# Patient Record
Sex: FEMALE | Race: WHITE | Employment: OTHER | URBAN - METROPOLITAN AREA
[De-identification: names, ages, dates, MRNs, and addresses within clinical notes are randomized per-mention and may not be internally consistent; named-entity substitution may affect disease eponyms.]

---

## 2017-07-27 ENCOUNTER — HOSPITAL ENCOUNTER (OUTPATIENT)
Dept: PREADMISSION TESTING | Age: 77
Discharge: HOME | End: 2017-07-27
Payer: MEDICARE

## 2017-07-27 VITALS — HEIGHT: 58 IN | WEIGHT: 140 LBS | BODY MASS INDEX: 29.39 KG/M2

## 2017-07-27 LAB
ANION GAP BLD CALC-SCNC: 10 MMOL/L (ref 3–18)
BUN SERPL-MCNC: 21 MG/DL (ref 7–18)
BUN/CREAT SERPL: 22 (ref 12–20)
CALCIUM SERPL-MCNC: 9.3 MG/DL (ref 8.5–10.1)
CHLORIDE SERPL-SCNC: 106 MMOL/L (ref 100–108)
CO2 SERPL-SCNC: 27 MMOL/L (ref 21–32)
CREAT SERPL-MCNC: 0.97 MG/DL (ref 0.6–1.3)
GLUCOSE SERPL-MCNC: 112 MG/DL (ref 74–99)
HCT VFR BLD AUTO: 37.3 % (ref 35–45)
HGB BLD-MCNC: 12.1 G/DL (ref 12–16)
POTASSIUM SERPL-SCNC: 4.2 MMOL/L (ref 3.5–5.5)
SODIUM SERPL-SCNC: 143 MMOL/L (ref 136–145)

## 2017-07-27 PROCEDURE — 80048 BASIC METABOLIC PNL TOTAL CA: CPT | Performed by: UROLOGY

## 2017-07-27 PROCEDURE — 85018 HEMOGLOBIN: CPT | Performed by: UROLOGY

## 2017-07-27 PROCEDURE — 93005 ELECTROCARDIOGRAM TRACING: CPT

## 2017-07-27 RX ORDER — SIMVASTATIN 20 MG/1
20 TABLET, FILM COATED ORAL
COMMUNITY
Start: 2013-08-26 | End: 2017-07-27

## 2017-07-27 RX ORDER — MINERAL OIL
180 ENEMA (ML) RECTAL DAILY
COMMUNITY
Start: 2017-05-12

## 2017-07-27 RX ORDER — MULTIVITAMIN
TABLET ORAL
COMMUNITY
Start: 2017-05-12 | End: 2017-07-27

## 2017-07-27 RX ORDER — BISMUTH SUBSALICYLATE 262 MG
TABLET,CHEWABLE ORAL
COMMUNITY
Start: 2012-11-02 | End: 2017-07-27

## 2017-07-27 RX ORDER — TRAZODONE HYDROCHLORIDE 100 MG/1
100 TABLET ORAL
COMMUNITY
End: 2017-07-27

## 2017-07-27 RX ORDER — ASPIRIN 81 MG/1
TABLET ORAL
COMMUNITY
Start: 2012-11-02 | End: 2019-07-17

## 2017-07-27 RX ORDER — LISINOPRIL 20 MG/1
TABLET ORAL
COMMUNITY
Start: 2017-04-05 | End: 2017-07-27

## 2017-07-27 NOTE — PERIOP NOTES
Chg wipes givenDenies any prostheticsPatient states family physician is aware of upcoming procedure/surgeryPatient instructed to bring cpap machine in on day of procedure/surgeryDenies family history of anesthesia complicationsDenies shortness of breath nor chest pain while climbing stairs

## 2017-07-28 LAB
ATRIAL RATE: 57 BPM
CALCULATED P AXIS, ECG09: 73 DEGREES
CALCULATED R AXIS, ECG10: 19 DEGREES
CALCULATED T AXIS, ECG11: 55 DEGREES
DIAGNOSIS, 93000: NORMAL
P-R INTERVAL, ECG05: 142 MS
Q-T INTERVAL, ECG07: 428 MS
QRS DURATION, ECG06: 98 MS
QTC CALCULATION (BEZET), ECG08: 416 MS
VENTRICULAR RATE, ECG03: 57 BPM

## 2017-08-07 ENCOUNTER — ANESTHESIA EVENT (OUTPATIENT)
Dept: SURGERY | Age: 77
End: 2017-08-07
Payer: MEDICARE

## 2017-08-08 ENCOUNTER — ANESTHESIA (OUTPATIENT)
Dept: SURGERY | Age: 77
End: 2017-08-08
Payer: MEDICARE

## 2017-08-08 ENCOUNTER — APPOINTMENT (OUTPATIENT)
Dept: GENERAL RADIOLOGY | Age: 77
End: 2017-08-08
Attending: UROLOGY
Payer: MEDICARE

## 2017-08-08 ENCOUNTER — HOSPITAL ENCOUNTER (OUTPATIENT)
Age: 77
Setting detail: OUTPATIENT SURGERY
Discharge: HOME | End: 2017-08-08
Attending: UROLOGY | Admitting: UROLOGY
Payer: MEDICARE

## 2017-08-08 VITALS
HEIGHT: 58 IN | WEIGHT: 140.31 LBS | RESPIRATION RATE: 16 BRPM | SYSTOLIC BLOOD PRESSURE: 136 MMHG | HEART RATE: 56 BPM | BODY MASS INDEX: 29.45 KG/M2 | OXYGEN SATURATION: 99 % | TEMPERATURE: 97.3 F | DIASTOLIC BLOOD PRESSURE: 61 MMHG

## 2017-08-08 DIAGNOSIS — R52 PAIN: ICD-10-CM

## 2017-08-08 LAB
ATRIAL RATE: 47 BPM
CALCULATED P AXIS, ECG09: 57 DEGREES
CALCULATED R AXIS, ECG10: 12 DEGREES
CALCULATED T AXIS, ECG11: 23 DEGREES
DIAGNOSIS, 93000: NORMAL
P-R INTERVAL, ECG05: 152 MS
Q-T INTERVAL, ECG07: 454 MS
QRS DURATION, ECG06: 98 MS
QTC CALCULATION (BEZET), ECG08: 401 MS
VENTRICULAR RATE, ECG03: 47 BPM

## 2017-08-08 PROCEDURE — 76010000153 HC OR TIME 1.5 TO 2 HR: Performed by: UROLOGY

## 2017-08-08 PROCEDURE — 74011000250 HC RX REV CODE- 250

## 2017-08-08 PROCEDURE — 76210000016 HC OR PH I REC 1 TO 1.5 HR: Performed by: UROLOGY

## 2017-08-08 PROCEDURE — 76000 FLUOROSCOPY <1 HR PHYS/QHP: CPT

## 2017-08-08 PROCEDURE — C1894 INTRO/SHEATH, NON-LASER: HCPCS | Performed by: UROLOGY

## 2017-08-08 PROCEDURE — 77030011640 HC PAD GRND REM COVD -A: Performed by: UROLOGY

## 2017-08-08 PROCEDURE — 77010033678 HC OXYGEN DAILY

## 2017-08-08 PROCEDURE — 77030012020 HC ANTENA NEURSTM MEDT -B: Performed by: UROLOGY

## 2017-08-08 PROCEDURE — 74011250636 HC RX REV CODE- 250/636

## 2017-08-08 PROCEDURE — C1767 GENERATOR, NEURO NON-RECHARG: HCPCS | Performed by: UROLOGY

## 2017-08-08 PROCEDURE — 74011250636 HC RX REV CODE- 250/636: Performed by: ANESTHESIOLOGY

## 2017-08-08 PROCEDURE — 76210000021 HC REC RM PH II 0.5 TO 1 HR: Performed by: UROLOGY

## 2017-08-08 PROCEDURE — 74011250636 HC RX REV CODE- 250/636: Performed by: UROLOGY

## 2017-08-08 PROCEDURE — 93005 ELECTROCARDIOGRAM TRACING: CPT

## 2017-08-08 PROCEDURE — 77030002986 HC SUT PROL J&J -A: Performed by: UROLOGY

## 2017-08-08 PROCEDURE — 76060000034 HC ANESTHESIA 1.5 TO 2 HR: Performed by: UROLOGY

## 2017-08-08 PROCEDURE — C1778 LEAD, NEUROSTIMULATOR: HCPCS | Performed by: UROLOGY

## 2017-08-08 PROCEDURE — 77030031139 HC SUT VCRL2 J&J -A: Performed by: UROLOGY

## 2017-08-08 PROCEDURE — 77030003028 HC SUT VCRL J&J -A: Performed by: UROLOGY

## 2017-08-08 PROCEDURE — 77030018836 HC SOL IRR NACL ICUM -A: Performed by: UROLOGY

## 2017-08-08 PROCEDURE — 74011000250 HC RX REV CODE- 250: Performed by: UROLOGY

## 2017-08-08 PROCEDURE — 77030020782 HC GWN BAIR PAWS FLX 3M -B: Performed by: UROLOGY

## 2017-08-08 PROCEDURE — C1787 PATIENT PROGR, NEUROSTIM: HCPCS | Performed by: UROLOGY

## 2017-08-08 DEVICE — LEAD KT STIM INTERSTIM 28CM --: Type: IMPLANTABLE DEVICE | Site: SACRUM | Status: FUNCTIONAL

## 2017-08-08 DEVICE — GENERATOR INTERSTIM X --: Type: IMPLANTABLE DEVICE | Site: SACRUM | Status: FUNCTIONAL

## 2017-08-08 RX ORDER — SODIUM CHLORIDE, SODIUM LACTATE, POTASSIUM CHLORIDE, CALCIUM CHLORIDE 600; 310; 30; 20 MG/100ML; MG/100ML; MG/100ML; MG/100ML
1000 INJECTION, SOLUTION INTRAVENOUS CONTINUOUS
Status: DISCONTINUED | OUTPATIENT
Start: 2017-08-08 | End: 2017-08-08 | Stop reason: HOSPADM

## 2017-08-08 RX ORDER — GLYCOPYRROLATE 0.2 MG/ML
INJECTION INTRAMUSCULAR; INTRAVENOUS AS NEEDED
Status: DISCONTINUED | OUTPATIENT
Start: 2017-08-08 | End: 2017-08-08 | Stop reason: HOSPADM

## 2017-08-08 RX ORDER — SULFAMETHOXAZOLE AND TRIMETHOPRIM 400; 80 MG/1; MG/1
1 TABLET ORAL 2 TIMES DAILY
Qty: 10 TAB | Refills: 0 | Status: SHIPPED | OUTPATIENT
Start: 2017-08-08 | End: 2018-10-26

## 2017-08-08 RX ORDER — LIDOCAINE HYDROCHLORIDE 20 MG/ML
INJECTION, SOLUTION EPIDURAL; INFILTRATION; INTRACAUDAL; PERINEURAL AS NEEDED
Status: DISCONTINUED | OUTPATIENT
Start: 2017-08-08 | End: 2017-08-08 | Stop reason: HOSPADM

## 2017-08-08 RX ORDER — MAGNESIUM SULFATE 100 %
4 CRYSTALS MISCELLANEOUS AS NEEDED
Status: DISCONTINUED | OUTPATIENT
Start: 2017-08-08 | End: 2017-08-08 | Stop reason: HOSPADM

## 2017-08-08 RX ORDER — HYDROCODONE BITARTRATE AND ACETAMINOPHEN 5; 325 MG/1; MG/1
1 TABLET ORAL
Qty: 20 TAB | Refills: 0 | Status: SHIPPED | OUTPATIENT
Start: 2017-08-08 | End: 2018-10-26

## 2017-08-08 RX ORDER — ONDANSETRON 2 MG/ML
INJECTION INTRAMUSCULAR; INTRAVENOUS AS NEEDED
Status: DISCONTINUED | OUTPATIENT
Start: 2017-08-08 | End: 2017-08-08 | Stop reason: HOSPADM

## 2017-08-08 RX ORDER — HYDROMORPHONE HYDROCHLORIDE 2 MG/ML
0.5 INJECTION, SOLUTION INTRAMUSCULAR; INTRAVENOUS; SUBCUTANEOUS
Status: DISCONTINUED | OUTPATIENT
Start: 2017-08-08 | End: 2017-08-08 | Stop reason: HOSPADM

## 2017-08-08 RX ORDER — FLUMAZENIL 0.1 MG/ML
0.2 INJECTION INTRAVENOUS
Status: DISCONTINUED | OUTPATIENT
Start: 2017-08-08 | End: 2017-08-08 | Stop reason: HOSPADM

## 2017-08-08 RX ORDER — INSULIN LISPRO 100 [IU]/ML
INJECTION, SOLUTION INTRAVENOUS; SUBCUTANEOUS ONCE
Status: DISCONTINUED | OUTPATIENT
Start: 2017-08-08 | End: 2017-08-08 | Stop reason: HOSPADM

## 2017-08-08 RX ORDER — SODIUM CHLORIDE 0.9 % (FLUSH) 0.9 %
5-10 SYRINGE (ML) INJECTION AS NEEDED
Status: DISCONTINUED | OUTPATIENT
Start: 2017-08-08 | End: 2017-08-08 | Stop reason: HOSPADM

## 2017-08-08 RX ORDER — NALOXONE HYDROCHLORIDE 0.4 MG/ML
0.1 INJECTION, SOLUTION INTRAMUSCULAR; INTRAVENOUS; SUBCUTANEOUS AS NEEDED
Status: DISCONTINUED | OUTPATIENT
Start: 2017-08-08 | End: 2017-08-08 | Stop reason: HOSPADM

## 2017-08-08 RX ORDER — PROPOFOL 10 MG/ML
INJECTION, EMULSION INTRAVENOUS
Status: DISCONTINUED | OUTPATIENT
Start: 2017-08-08 | End: 2017-08-08 | Stop reason: HOSPADM

## 2017-08-08 RX ORDER — SODIUM CHLORIDE, SODIUM LACTATE, POTASSIUM CHLORIDE, CALCIUM CHLORIDE 600; 310; 30; 20 MG/100ML; MG/100ML; MG/100ML; MG/100ML
125 INJECTION, SOLUTION INTRAVENOUS CONTINUOUS
Status: DISCONTINUED | OUTPATIENT
Start: 2017-08-08 | End: 2017-08-08 | Stop reason: HOSPADM

## 2017-08-08 RX ORDER — PROPOFOL 10 MG/ML
INJECTION, EMULSION INTRAVENOUS AS NEEDED
Status: DISCONTINUED | OUTPATIENT
Start: 2017-08-08 | End: 2017-08-08 | Stop reason: HOSPADM

## 2017-08-08 RX ORDER — KETAMINE HYDROCHLORIDE 10 MG/ML
INJECTION, SOLUTION INTRAMUSCULAR; INTRAVENOUS AS NEEDED
Status: DISCONTINUED | OUTPATIENT
Start: 2017-08-08 | End: 2017-08-08 | Stop reason: HOSPADM

## 2017-08-08 RX ORDER — MIDAZOLAM HYDROCHLORIDE 1 MG/ML
INJECTION, SOLUTION INTRAMUSCULAR; INTRAVENOUS AS NEEDED
Status: DISCONTINUED | OUTPATIENT
Start: 2017-08-08 | End: 2017-08-08 | Stop reason: HOSPADM

## 2017-08-08 RX ORDER — EPHEDRINE SULFATE/0.9% NACL/PF 25 MG/5 ML
SYRINGE (ML) INTRAVENOUS AS NEEDED
Status: DISCONTINUED | OUTPATIENT
Start: 2017-08-08 | End: 2017-08-08 | Stop reason: HOSPADM

## 2017-08-08 RX ORDER — FENTANYL CITRATE 50 UG/ML
INJECTION, SOLUTION INTRAMUSCULAR; INTRAVENOUS AS NEEDED
Status: DISCONTINUED | OUTPATIENT
Start: 2017-08-08 | End: 2017-08-08 | Stop reason: HOSPADM

## 2017-08-08 RX ORDER — DEXTROSE 50 % IN WATER (D50W) INTRAVENOUS SYRINGE
25-50 AS NEEDED
Status: DISCONTINUED | OUTPATIENT
Start: 2017-08-08 | End: 2017-08-08 | Stop reason: HOSPADM

## 2017-08-08 RX ADMIN — SODIUM CHLORIDE 1000 MG: 900 INJECTION, SOLUTION INTRAVENOUS at 06:47

## 2017-08-08 RX ADMIN — FENTANYL CITRATE 25 MCG: 50 INJECTION, SOLUTION INTRAMUSCULAR; INTRAVENOUS at 08:39

## 2017-08-08 RX ADMIN — PROPOFOL 20 MG: 10 INJECTION, EMULSION INTRAVENOUS at 09:12

## 2017-08-08 RX ADMIN — SODIUM CHLORIDE, SODIUM LACTATE, POTASSIUM CHLORIDE, AND CALCIUM CHLORIDE: 600; 310; 30; 20 INJECTION, SOLUTION INTRAVENOUS at 08:45

## 2017-08-08 RX ADMIN — KETAMINE HYDROCHLORIDE 10 MG: 10 INJECTION, SOLUTION INTRAMUSCULAR; INTRAVENOUS at 08:44

## 2017-08-08 RX ADMIN — KETAMINE HYDROCHLORIDE 20 MG: 10 INJECTION, SOLUTION INTRAMUSCULAR; INTRAVENOUS at 08:08

## 2017-08-08 RX ADMIN — GLYCOPYRROLATE 0.1 MG: 0.2 INJECTION INTRAMUSCULAR; INTRAVENOUS at 07:38

## 2017-08-08 RX ADMIN — PROPOFOL 20 MG: 10 INJECTION, EMULSION INTRAVENOUS at 09:19

## 2017-08-08 RX ADMIN — ONDANSETRON 4 MG: 2 INJECTION INTRAMUSCULAR; INTRAVENOUS at 07:41

## 2017-08-08 RX ADMIN — HYDROMORPHONE HYDROCHLORIDE 0.5 MG: 2 INJECTION INTRAMUSCULAR; INTRAVENOUS; SUBCUTANEOUS at 09:48

## 2017-08-08 RX ADMIN — KETAMINE HYDROCHLORIDE 10 MG: 10 INJECTION, SOLUTION INTRAMUSCULAR; INTRAVENOUS at 08:31

## 2017-08-08 RX ADMIN — MIDAZOLAM HYDROCHLORIDE 2 MG: 1 INJECTION, SOLUTION INTRAMUSCULAR; INTRAVENOUS at 07:33

## 2017-08-08 RX ADMIN — PROPOFOL 20 MG: 10 INJECTION, EMULSION INTRAVENOUS at 09:23

## 2017-08-08 RX ADMIN — PROPOFOL 20 MG: 10 INJECTION, EMULSION INTRAVENOUS at 09:16

## 2017-08-08 RX ADMIN — PROPOFOL 100 MCG/KG/MIN: 10 INJECTION, EMULSION INTRAVENOUS at 07:39

## 2017-08-08 RX ADMIN — HYDROMORPHONE HYDROCHLORIDE 0.5 MG: 2 INJECTION INTRAMUSCULAR; INTRAVENOUS; SUBCUTANEOUS at 10:07

## 2017-08-08 RX ADMIN — FENTANYL CITRATE 25 MCG: 50 INJECTION, SOLUTION INTRAMUSCULAR; INTRAVENOUS at 07:41

## 2017-08-08 RX ADMIN — LIDOCAINE HYDROCHLORIDE 60 MG: 20 INJECTION, SOLUTION EPIDURAL; INFILTRATION; INTRACAUDAL; PERINEURAL at 07:42

## 2017-08-08 RX ADMIN — Medication 5 MG: at 07:47

## 2017-08-08 RX ADMIN — SODIUM CHLORIDE, SODIUM LACTATE, POTASSIUM CHLORIDE, AND CALCIUM CHLORIDE 125 ML/HR: 600; 310; 30; 20 INJECTION, SOLUTION INTRAVENOUS at 06:40

## 2017-08-08 RX ADMIN — GLYCOPYRROLATE 0.1 MG: 0.2 INJECTION INTRAMUSCULAR; INTRAVENOUS at 07:33

## 2017-08-08 RX ADMIN — FENTANYL CITRATE 25 MCG: 50 INJECTION, SOLUTION INTRAMUSCULAR; INTRAVENOUS at 08:05

## 2017-08-08 NOTE — PERIOP NOTES
Dr. Abdifatah Garcia at bedside evaluating patient for bradycardia. Informed that patient dropped into the 30's. Patient given caffeine to increase heart rate. States that patient is at her baseline for heart rate. Informed to get an EKG just to rule out anything. Informed to get a strip with the patient in the 30's.

## 2017-08-08 NOTE — ANESTHESIA PREPROCEDURE EVALUATION
Anesthetic History   No history of anesthetic complications            Review of Systems / Medical History  Patient summary reviewed, nursing notes reviewed and pertinent labs reviewed    Pulmonary        Sleep apnea: CPAP           Neuro/Psych              Cardiovascular    Hypertension: well controlled              Exercise tolerance: >4 METS     GI/Hepatic/Renal  Within defined limits              Endo/Other      Hypothyroidism: well controlled  Arthritis     Other Findings            Physical Exam    Airway  Mallampati: III  TM Distance: < 4 cm  Neck ROM: normal range of motion   Mouth opening: Normal     Cardiovascular    Rhythm: regular  Rate: normal         Dental  No notable dental hx       Pulmonary  Breath sounds clear to auscultation               Abdominal  GI exam deferred       Other Findings            Anesthetic Plan    ASA: 3  Anesthesia type: MAC          Induction: Intravenous  Anesthetic plan and risks discussed with: Patient

## 2017-08-08 NOTE — DISCHARGE INSTRUCTIONS
1) HOLD LISINOPRIL UNTIL Monday  2) REMOVE DRESSING ON Friday MORNING  3) LEAVE STERI-STRIPS IN PLACE FOR AS LONG AS THEY WILL STAY THERE  4) YOU MAY SHOWER ON Friday MORNING  5) NO BATHS OR SWIMMING FOR 6 WEEKS          DISCHARGE SUMMARY from Nurse    The following personal items are in your possession at time of discharge:    Dental Appliances: None  Visual Aid: None     Home Medications: None  Jewelry: None  Clothing: Footwear, Pants, Shirt, Socks, Undergarments (locker 5)  Other Valuables: None             PATIENT INSTRUCTIONS:    After general anesthesia or intravenous sedation, for 24 hours or while taking prescription Narcotics:  · Limit your activities  · Do not drive and operate hazardous machinery  · Do not make important personal or business decisions  · Do  not drink alcoholic beverages  · If you have not urinated within 8 hours after discharge, please contact your surgeon on call. Report the following to your surgeon:  · Excessive pain, swelling, redness or odor of or around the surgical area  · Temperature over 100.5  · Nausea and vomiting lasting longer than 4 hours or if unable to take medications  · Any signs of decreased circulation or nerve impairment to extremity: change in color, persistent  numbness, tingling, coldness or increase pain  · Any questions        What to do at Home:  Regular diet drink lots of liquids  Dr Shahla Spivey will call regarding a post op check up    If you experience any of the following symptoms heavy bleeding, unable to void, fevers, severe pain, please follow up with dr Shahla Spivey      *  Please give a list of your current medications to your Primary Care Provider. *  Please update this list whenever your medications are discontinued, doses are      changed, or new medications (including over-the-counter products) are added. *  Please carry medication information at all times in case of emergency situations.           These are general instructions for a healthy lifestyle:    No smoking/ No tobacco products/ Avoid exposure to second hand smoke    Surgeon General's Warning:  Quitting smoking now greatly reduces serious risk to your health. Obesity, smoking, and sedentary lifestyle greatly increases your risk for illness    A healthy diet, regular physical exercise & weight monitoring are important for maintaining a healthy lifestyle    You may be retaining fluid if you have a history of heart failure or if you experience any of the following symptoms:  Weight gain of 3 pounds or more overnight or 5 pounds in a week, increased swelling in our hands or feet or shortness of breath while lying flat in bed. Please call your doctor as soon as you notice any of these symptoms; do not wait until your next office visit. Recognize signs and symptoms of STROKE:    F-face looks uneven    A-arms unable to move or move unevenly    S-speech slurred or non-existent    T-time-call 911 as soon as signs and symptoms begin-DO NOT go       Back to bed or wait to see if you get better-TIME IS BRAIN. Warning Signs of HEART ATTACK     Call 911 if you have these symptoms:   Chest discomfort. Most heart attacks involve discomfort in the center of the chest that lasts more than a few minutes, or that goes away and comes back. It can feel like uncomfortable pressure, squeezing, fullness, or pain.  Discomfort in other areas of the upper body. Symptoms can include pain or discomfort in one or both arms, the back, neck, jaw, or stomach.  Shortness of breath with or without chest discomfort.  Other signs may include breaking out in a cold sweat, nausea, or lightheadedness. Don't wait more than five minutes to call 911 - MINUTES MATTER! Fast action can save your life. Calling 911 is almost always the fastest way to get lifesaving treatment. Emergency Medical Services staff can begin treatment when they arrive -- up to an hour sooner than if someone gets to the hospital by car.        The discharge information has been reviewed with the patient and caregiver. The patient and caregiver verbalized understanding. Discharge medications reviewed with the patient and caregiver and appropriate educational materials and side effects teaching were provided.       Patient armband removed and shredded

## 2017-08-08 NOTE — ANESTHESIA POSTPROCEDURE EVALUATION
Post-Anesthesia Evaluation and Assessment    Patient: Kristy Magana MRN: [id number]  SSN: [id number]    YOB: 1940  Age: 64 y.o. Sex: female       Cardiovascular Function/Vital Signs  Visit Vitals    /42    Pulse (!) 52    Temp 36.1 °C (97 °F)    Resp 16    Ht 4' 10\" (1.473 m)    Wt 63.6 kg (140 lb 5 oz)    SpO2 99%    BMI 29.33 kg/m2       Patient is status post mac anesthesia for Procedure(s):   W/C-ARM EXPLANT INTERSTIM GENERATER AND LEADLEAD,INTERSTIM STAGE 1 &2 IMPLANT INTERSTIM LEAD AND HARDWARE- NEW LEAD AND NEW GENERATOR WITH INTROPERATIVE PROGRAMMING AND  FLOUROSCOPY USE. Nausea/Vomiting: None    Postoperative hydration reviewed and adequate. Pain:  Pain Scale 1: Numeric (0 - 10) (08/08/17 1021)  Pain Intensity 1: 3 (08/08/17 1021)   Managed    Neurological Status:   Neuro (WDL): Exceptions to WDL (08/08/17 5264)  Neuro  Neurologic State: Drowsy (08/08/17 0620)  LUE Motor Response: Purposeful (08/08/17 0938)  LLE Motor Response: Purposeful (08/08/17 0938)  RUE Motor Response: Purposeful (08/08/17 2057)  RLE Motor Response: Purposeful (08/08/17 6828)   At baseline    Mental Status and Level of Consciousness: Alert and oriented     Pulmonary Status:   Room air   Adequate oxygenation and airway patent    Complications related to anesthesia: None    Post-anesthesia assessment completed.  No concerns    Signed By: Ashlie Jauregui CRNA     August 8, 2017

## 2017-08-08 NOTE — IP AVS SNAPSHOT
[address] [place] [id number] Patient: Mp Hernandez MRN: [id number] :1940 You are allergic to the following No active allergies Recent Documentation Height Weight BMI OB Status Smoking Status 1.473 m 63.6 kg 29.33 kg/m2 Hysterectomy Former Smoker Emergency Contacts Name Discharge Info Relation Home Work Mobile [address] CAREGIVER [3] Friend [5] [phone] About your hospitalization You were admitted on:  2017 You last received care in the:  [facility] PHASE 2 RECOVERY You were discharged on:  2017 Unit phone number:    
  
Why you were hospitalized Your primary diagnosis was:  Urge Incontinence Providers Seen During Your Hospitalizations Provider Role Specialty Primary office phone Cherylene Deacon, MD Attending Provider Urology [phone] Your Primary Care Physician (PCP) Primary Care Physician Office Phone Office Fax Luiz Caleroo, [address] [place] [address] [phone] Follow-up Information Follow up With Details Comments Contact Info Edi Merino MD    [place] [address] 
[phone] Current Discharge Medication List  
  
START taking these medications Dose & Instructions Dispensing Information Comments Morning Noon Evening Bedtime HYDROcodone-acetaminophen 5-325 mg per tablet Commonly known as:  Vercami Lozada Your last dose was: Your next dose is:    
   
   
 Dose:  1 Tab Take 1 Tab by mouth every six (6) hours as needed for Pain. Max Daily Amount: 4 Tabs. Quantity:  20 Tab Refills:  0  
     
   
   
   
  
 trimethoprim-sulfamethoxazole  mg per tablet Commonly known as:  BACTRIM Your last dose was: Your next dose is:    
   
   
 Dose:  1 Tab Take 1 Tab by mouth two (2) times a day. Quantity:  10 Tab Refills:  0 CONTINUE these medications which have NOT CHANGED Dose & Instructions Dispensing Information Comments Morning Noon Evening Bedtime  
 aspirin delayed-release 81 mg tablet Your last dose was: Your next dose is: ADULT ASPIRIN LOW STRENGTH 81 MG TBDP Refills:  0  
     
   
   
   
  
 CALCIUM 600 + D 600-125 mg-unit Tab Generic drug:  calcium-cholecalciferol (d3) Your last dose was: Your next dose is: Take  by mouth daily. Refills:  0  
     
   
   
   
  
 fexofenadine 180 mg tablet Commonly known as:  Moreno Grissom Your last dose was: Your next dose is:    
   
   
 daily. Indications: ALLERGIC RHINITIS Refills:  0  
     
   
   
   
  
 FISH OIL 60- mg Cpdr  
Generic drug:  Omega-3 Fatty Acids Your last dose was: Your next dose is:    
   
   
 Dose:  1 Tab Take 1 Tab by mouth daily. Refills:  0 LEVOTHROID 50 mcg tablet Generic drug:  levothyroxine Your last dose was: Your next dose is: Take  by mouth Daily (before breakfast). Indications: HYPOTHYROIDISM Refills:  0  
     
   
   
   
  
 lisinopril 20 mg tablet Commonly known as:  Dorathy Massed Your last dose was: Your next dose is:    
   
   
 Dose:  20 mg Take 20 mg by mouth daily. Take dos  Indications: HYPERTENSION Refills:  0 MIRALAX 17 gram packet Generic drug:  polyethylene glycol Your last dose was: Your next dose is:    
   
   
 Dose:  17 g Take 17 g by mouth daily as needed. Refills:  0  
     
   
   
   
  
 multivitamin tablet Commonly known as:  ONE A DAY Your last dose was: Your next dose is:    
   
   
 Dose:  1 Tab Take 1 Tab by mouth daily. Refills:  0  
     
   
   
   
  
 simvastatin 20 mg tablet Commonly known as:  ZOCOR  
 Your last dose was: Your next dose is: Take  by mouth nightly. Indications: hypercholesterolemia Refills:  0  
     
   
   
   
  
 traZODone 100 mg tablet Commonly known as:  Omayra Montenegro Your last dose was: Your next dose is:    
   
   
 Dose:  100 mg Take 100 mg by mouth nightly. insomnia Refills:  0 Where to Get Your Medications Information on where to get these meds will be given to you by the nurse or doctor. ! Ask your nurse or doctor about these medications HYDROcodone-acetaminophen 5-325 mg per tablet  
 trimethoprim-sulfamethoxazole  mg per tablet Discharge Instructions 1) HOLD LISINOPRIL UNTIL Monday 2) REMOVE DRESSING ON Friday MORNING 3) LEAVE STERI-STRIPS IN PLACE FOR AS LONG AS THEY WILL STAY THERE 
4) YOU MAY SHOWER ON Friday MORNING 5) NO BATHS OR SWIMMING FOR 6 WEEKS DISCHARGE SUMMARY from Nurse The following personal items are in your possession at time of discharge: 
 
Dental Appliances: None Visual Aid: None Home Medications: None Jewelry: None Clothing: Footwear, Pants, Shirt, Socks, Undergarments (locker 5) Other Valuables: None PATIENT INSTRUCTIONS: 
 
 
F-face looks uneven A-arms unable to move or move unevenly S-speech slurred or non-existent T-time-call 911 as soon as signs and symptoms begin-DO NOT go Back to bed or wait to see if you get better-TIME IS BRAIN. Warning Signs of HEART ATTACK Call 911 if you have these symptoms: 
? Chest discomfort.  Most heart attacks involve discomfort in the center of the chest that lasts more than a few minutes, or that goes away and comes back. It can feel like uncomfortable pressure, squeezing, fullness, or pain. ? Discomfort in other areas of the upper body. Symptoms can include pain or discomfort in one or both arms, the back, neck, jaw, or stomach. ? Shortness of breath with or without chest discomfort. ? Other signs may include breaking out in a cold sweat, nausea, or lightheadedness. Don't wait more than five minutes to call [address]! Fast action can save your life. Calling 911 is almost always the fastest way to get lifesaving treatment. Emergency Medical Services staff can begin treatment when they arrive  up to an hour sooner than if someone gets to the hospital by car. The discharge information has been reviewed with the patient and caregiver. The patient and caregiver verbalized understanding. Discharge medications reviewed with the patient and caregiver and appropriate educational materials and side effects teaching were provided. Patient armband removed and shredded Discharge Orders None Introducing [facility]! Arminda Lockett introduces [employer] patient portal. Now you can access parts of your medical record, email your doctor's office, and request medication refills online. 1. In your internet browser, go to [email or web address] 2. Click on the First Time User? Click Here link in the Sign In box. You will see the New Member Sign Up page. 3. Enter your [employer]Doc Access Code exactly as it appears below. You will not need to use this code after youve completed the sign-up process. If you do not sign up before the expiration date, you must request a new code. · [employer] Access Code: [email or web address] Expires: 10/16/2017 12:22 PM 
 
4. Enter the last four digits of your Social Security Number (xxxx) and Date of Birth (mm/dd/yyyy) as indicated and click Submit.  You will be taken to the next sign-up page. 5. Create a [employer] ID. This will be your [employer] login ID and cannot be changed, so think of one that is secure and easy to remember. 6. Create a [employer] password. You can change your password at any time. 7. Enter your Password Reset Question and Answer. This can be used at a later time if you forget your password. 8. Enter your e-mail address. You will receive e-mail notification when new information is available in [address] 9. Click Sign Up. You can now view and download portions of your medical record. 10. Click the Download Summary menu link to download a portable copy of your medical information. If you have questions, please visit the Frequently Asked Questions section of the [employer] website. Remember, [employer] is NOT to be used for urgent needs. For medical emergencies, dial 911. Now available from your iPhone and Android! General Information Please provide this summary of care documentation to your next provider. Patient Signature:  ____________________________________________________________ Date:  ____________________________________________________________  
  
Jennifer Su Provider Signature:  ____________________________________________________________ Date:  ____________________________________________________________

## 2017-08-08 NOTE — PERIOP NOTES
TRANSFER - IN REPORT:    Verbal report received from [place] ([facility]), CRNA and OR nurse on Liam Huynh  being received from OR for routine post - op      Report consisted of patients Situation, Background, Assessment and   Recommendations(SBAR). Information from the following report(s) SBAR, Kardex, OR Summary, Procedure Summary, Intake/Output, MAR, Recent Results and Med Rec Status was reviewed with the receiving nurse. Opportunity for questions and clarification was provided. Assessment completed upon patients arrival to unit and care assumed.

## 2017-08-08 NOTE — H&P
History and Physical    Subjective:      Clemencia Holly is a 64 y.o. female evaluated with worsening urge incontinence. She had an interstim placed 10/2015 and did well for the first year, but then had declining symptoms of urge incontinence and has failed reprogramming. She presents for evaluation and stage 1, 2 revision    Past Medical History:   Diagnosis Date    Allergic rhinitis     Arthritis     Heart murmur     Hypercholesteremia     Hypertension 2002    Sleep apnea     use cpap rate 10    Thyroid disease     hypo    Urine, incontinence, stress female      Past Surgical History:   Procedure Laterality Date    HX BREAST BIOPSY      right    HX HYSTERECTOMY      NEUROLOGICAL PROCEDURE UNLISTED      Interstim Stage 1      Family History   Problem Relation Age of Onset    Stroke Maternal Grandfather      Social History   Substance Use Topics    Smoking status: Former Smoker     Quit date: 10/8/1985    Smokeless tobacco: Never Used    Alcohol use 0.6 oz/week     1 Glasses of wine per week      Comment: per month     No Known Allergies  Prior to Admission medications    Medication Sig Start Date End Date Taking? Authorizing Provider   aspirin delayed-release 81 mg tablet ADULT ASPIRIN LOW STRENGTH 81 MG TBDP 11/2/12  Yes Historical Provider   fexofenadine (ALLEGRA) 180 mg tablet daily. Indications: ALLERGIC RHINITIS 5/12/17  Yes Historical Provider   Omega-3 Fatty Acids (FISH OIL) 500 mg cpDR Take 1 Tab by mouth daily. Yes Historical Provider   multivitamin (ONE A DAY) tablet Take 1 Tab by mouth daily. Yes Historical Provider   calcium-cholecalciferol, d3, (CALCIUM 600 + D) 600-125 mg-unit tab Take  by mouth daily. Yes Historical Provider   lisinopril (PRINIVIL, ZESTRIL) 20 mg tablet Take 20 mg by mouth daily. Take dos  Indications: HYPERTENSION   Yes Historical Provider   levothyroxine (LEVOTHROID) 50 mcg tablet Take  by mouth Daily (before breakfast).  Indications: HYPOTHYROIDISM   Yes Historical Provider   simvastatin (ZOCOR) 20 mg tablet Take  by mouth nightly. Indications: hypercholesterolemia   Yes Historical Provider   traZODone (DESYREL) 100 mg tablet Take 100 mg by mouth nightly. insomnia   Yes Historical Provider   polyethylene glycol (MIRALAX) 17 gram packet Take 17 g by mouth daily as needed. Yes Historical Provider        Review of Systems:  A comprehensive review of systems was negative except for that written in the HPI. Objective:      Patient Vitals for the past 8 hrs:   BP Temp Pulse Resp SpO2 Height Weight   17 0627 134/64 97.7 °F (36.5 °C) (!) 54 17 94 % - -   17 0550 - - - - - 4' 10\" (1.473 m) 63.6 kg (140 lb 5 oz)       Temp (24hrs), Av.7 °F (36.5 °C), Min:97.7 °F (36.5 °C), Max:97.7 °F (36.5 °C)      Physical Exam:  GENERAL: alert, cooperative, no distress, appears stated age, THROAT & NECK: normal and no erythema or exudates noted. , LUNG: clear to auscultation bilaterally, HEART: regular rate and rhythm, ABDOMEN: soft, non-tender, non-distended   SKIN: normal.  No erythrema or rash      Assessment:     Urge  incontinence      Plan:     1. The risks, benefits, complications, treatment options, and expected outcomes were discussed with the patient. The patient understands the risks, any and all questions were answered to the patient's satisfaction.   2. Proceed with outpatient interstim revision -- stage 1 and 2    Signed By: Heriberto Delatorre MD                         2017

## 2017-08-08 NOTE — PERIOP NOTES
Verified EKG and 36 bpm EKG strip with Dr. Jourdan Page. Patient asyptomatic. Dr. Jourdan Page came to the bedside to evaluate patient. Instructed to send patient downstairs.

## 2017-08-08 NOTE — PERIOP NOTES
TRANSFER - OUT REPORT:    Verbal report given to Yovanny Silveira RN on Barbi Prime  being transferred to Phase II for routine progression of care       Report consisted of patients Situation, Background, Assessment and   Recommendations(SBAR). Information from the following report(s) SBAR, Kardex, OR Summary, Procedure Summary, Intake/Output, MAR, Recent Results and Med Rec Status was reviewed with the receiving nurse. Lines:   Peripheral IV 08/08/17 Right Hand (Active)   Site Assessment Clean, dry, & intact 8/8/2017  6:40 AM   Phlebitis Assessment 0 8/8/2017  6:40 AM   Infiltration Assessment 0 8/8/2017  6:40 AM   Dressing Status Clean, dry, & intact 8/8/2017  6:40 AM   Dressing Type Transparent 8/8/2017  6:40 AM   Hub Color/Line Status Pink 8/8/2017  6:40 AM        Opportunity for questions and clarification was provided.       Patient transported with:   [employer]

## 2017-08-08 NOTE — BRIEF OP NOTE
BRIEF OPERATIVE NOTE    Date of Procedure: 8/8/2017   Preoperative Diagnosis: FREQUENCY OF MICTURITION  Postoperative Diagnosis: FREQUENCY OF MICTURITION    Procedure(s):    EXPLANT INTERSTIM GENERATER AND LEFT LEAD,INTERSTIM STAGE 1 &2 IMPLANT INTERSTIM LEAD AND HARDWARE- NEW LEAD AND NEW GENERATOR WITH INTROPERATIVE PROGRAMMING AND  FLOUROSCOPY USE  Surgeon(s) and Role:     * Harriett Muñoz MD - Primary         Assistant Staff:       Surgical Staff:  Circ-1: Roverto Deng RN  Circ-2: Vivienne Mata  Radiology Technician: Jessie Wheeler  Scrub Tech-1: Anthony Ambrocio  Scrub RN-1: Rosalinda Clifton RN  Event Time In   Incision Start 0728   Incision Close 3325     Anesthesia: MAC   Estimated Blood Loss: MINIMAL  Specimens: * No specimens in log *   Findings: WEAK S[place] AND TOE ON INDWELLING LEFT LEAD, BUT THE LEAD APPEARED HIGH AND MIGRATED. RIGHT LEAD WAS IN S-3 FORAMEN WITH STRONG [place] AT 1,2,3 AND WEAK TOE AT 2. Complications: NONE  Implants:   Implant Name Type Inv.  Item Serial No.  Lot No. LRB No. Used Action   LEAD KT STIM INTERSTIM 28CM --  - [id number]  LEAD KT STIM INTERSTIM 28CM --   [employer] [id number] N/A 1 Implanted   GENERATOR INTERSTIM II IPG --  - [id number]   GENERATOR INTERSTIM II IPG --  [id number] [employer]   N/A 1 Implanted

## 2017-08-23 NOTE — OP NOTES
[address] [place] [address]  OPERATIVE REPORT    Name:  Dima Burkett  MR#:  [id number]  :  1940  Account #:  [de-identified]  Date of Adm:  2017  Date of Surgery:  2017      PREOPERATIVE DIAGNOSIS: Frequency of urination. POSTOPERATIVE DIAGNOSIS: Frequency of urination. PROCEDURE: Explant of InterStim generator in the left lead and  InterStim stage I and II implant of new InterStim lead and new  InterStim generator with intraoperative programming and fluoroscopy  used. SURGEON: Myrtle Condon MD.    ANESTHESIA: MAC.    ESTIMATED BLOOD LOSS: Minimal.    SPECIMENS REMOVED: None. FINDINGS: There was a weak bellow and toe on the indwelling left  lead that was then removed. The lead seemed to have hide, it  appeared to have migrated. The right lead was in the S3 foramen with  strong bellow at positions 1, 2 and 3 and weak toe at 2. COMPLICATIONS: None. IMPLANTS: The new InterStim lead and new InterStim generator. CLINICAL NOTE: The patient is a 68-year-old female with a history of  severe urinary urgency and frequency. She had an InterStim that  seemed to work for while, then stopped. She presents for evaluation of  probable replacement. OPERATIVE REPORT: Preoperatively, the risks and benefits of  surgery were described to the patient. The risks include, but are not  limited to bleeding, infection, injury, pain, and possible failure of the  intended work, possible need for future procedures. The patient  assumed the risks and signed the informed consent. The patient was  taken to the operating room, placed on the OR table in the supine  position. She was administered MAC anesthetic. She was  administered intravenous antibiotics. She was then placed in the prone  position and prepped and draped in the usual sterile manner. Next,  after a timeout was done, fluoroscopy was used to identify exactly the  lead placement and generator placement.  Through her previous scar,  an incision was made in the left upper outer buttock area over top of  the generator. The incision was carried down to the generator after  careful dissection to make sure there were not any wires there. Next,  once I came down to the generator, it was delivered through the  incision. At this point, the pocket for the generator was irrigated  copiously with antibiotic solution and packed with gauze. Next, using a  hex nut screwdriver, the InterStim generator was freed from the lead. The lead was tested at points 0, 1, 2 and 3 and there was noted to be  a very weak bellow and toe on the indwelling left lead in a couple of  spots, but it was extremely weak. Fluoroscopy was used in the AP and  cross table lateral views and the lead appeared to have migrated  posteriorly and did not seem to be in good position. Next, using  fluoroscopic guidance, an incision was made over the lead insertion  point on that left side and it was removed after the lead was identified  and controlled. The entirety was removed intact. The area was  irrigated copiously with antibiotic solution. Next, after all landmarks  were identified using fluoroscopy, the needles were placed on the right  and left side into the S3 foramen and tested. After much testing going  into the S3, S2 and S4 foramen, I was able to get the best response on  the right side with excellent toe and bellow on the right in the S3  foramen as confirmed by fluoroscopy. Next, the directional guide was  placed through the foramen needle and the foramen needle was  removed. An incision was made next to the directional guide and a  dilator sheath was passed over it under continuous fluoroscopic  guidance. Once I had gone into the S3 foramen adequately, the inner  aspect of the dilator sheath was removed and a tined lead was placed  through it.  Once the leads were exposed, but the tines were still not  exposed, the lead was tested and adjusted and I finally got to a point  where I had excellent bellow response at positions 1, 2 and 3 and  weak toe at 2. Fluoroscopy confirmed that everything was in good  position and under continuous fluoroscopic guidance, the sheath was  removed, leaving it in place. Next, the tunneler was then going from  the right side from the right incision where the lead was to the pocket  on the left and the lead was then delivered through that. Everything  was irrigated copiously of all loose material and dried well. The lead  was inserted into a new InterStim generator and the screws were  tightened with the hex nut screwdriver. Next, I placed the new  generator into the pocket. Everything was again irrigated copiously and  dried with a sponge. At this point, intraoperative programming was  done in the usual manner, and all impedances were normal. The  Adam's fascia was closed with interrupted 3-0 Vicryl suture. All skin  incisions were then closed with 4-0 Vicryl subcuticular stricture. At this  point, the sterile dressings were applied. The patient was then taken to  recovery in stable condition.         Tony Burkett MD    [address]  / ROSALIND  D:  08/23/2017   07:34  T:  08/23/2017   10:33  Job #:  [id number]

## 2018-10-11 ENCOUNTER — HOSPITAL ENCOUNTER (OUTPATIENT)
Dept: NON INVASIVE DIAGNOSTICS | Age: 78
Discharge: HOME | End: 2018-10-11
Payer: MEDICARE

## 2018-10-11 PROCEDURE — 93005 ELECTROCARDIOGRAM TRACING: CPT

## 2018-10-14 LAB
ATRIAL RATE: 49 BPM
CALCULATED P AXIS, ECG09: 77 DEGREES
CALCULATED R AXIS, ECG10: 18 DEGREES
CALCULATED T AXIS, ECG11: 56 DEGREES
DIAGNOSIS, 93000: NORMAL
P-R INTERVAL, ECG05: 154 MS
Q-T INTERVAL, ECG07: 444 MS
QRS DURATION, ECG06: 94 MS
QTC CALCULATION (BEZET), ECG08: 401 MS
VENTRICULAR RATE, ECG03: 49 BPM

## 2018-10-30 ENCOUNTER — ANESTHESIA (OUTPATIENT)
Dept: SURGERY | Age: 78
End: 2018-10-30
Payer: MEDICARE

## 2018-10-30 ENCOUNTER — HOSPITAL ENCOUNTER (OUTPATIENT)
Age: 78
Setting detail: OUTPATIENT SURGERY
Discharge: HOME | End: 2018-10-30
Attending: UROLOGY | Admitting: UROLOGY
Payer: MEDICARE

## 2018-10-30 ENCOUNTER — ANESTHESIA EVENT (OUTPATIENT)
Dept: SURGERY | Age: 78
End: 2018-10-30
Payer: MEDICARE

## 2018-10-30 VITALS
WEIGHT: 144.38 LBS | RESPIRATION RATE: 16 BRPM | SYSTOLIC BLOOD PRESSURE: 133 MMHG | HEART RATE: 63 BPM | DIASTOLIC BLOOD PRESSURE: 62 MMHG | OXYGEN SATURATION: 95 % | HEIGHT: 60 IN | BODY MASS INDEX: 28.35 KG/M2 | TEMPERATURE: 98 F

## 2018-10-30 PROCEDURE — 77030018832 HC SOL IRR H20 ICUM -A: Performed by: UROLOGY

## 2018-10-30 PROCEDURE — 74011250636 HC RX REV CODE- 250/636: Performed by: UROLOGY

## 2018-10-30 PROCEDURE — 74011250636 HC RX REV CODE- 250/636

## 2018-10-30 PROCEDURE — 77030020782 HC GWN BAIR PAWS FLX 3M -B: Performed by: UROLOGY

## 2018-10-30 PROCEDURE — 77030032490 HC SLV COMPR SCD KNE COVD -B: Performed by: UROLOGY

## 2018-10-30 PROCEDURE — 76210000026 HC REC RM PH II 1 TO 1.5 HR: Performed by: UROLOGY

## 2018-10-30 PROCEDURE — 74011000250 HC RX REV CODE- 250: Performed by: UROLOGY

## 2018-10-30 PROCEDURE — 74011000250 HC RX REV CODE- 250

## 2018-10-30 PROCEDURE — 77030018836 HC SOL IRR NACL ICUM -A: Performed by: UROLOGY

## 2018-10-30 PROCEDURE — 76060000031 HC ANESTHESIA FIRST 0.5 HR: Performed by: UROLOGY

## 2018-10-30 PROCEDURE — 74011000272 HC RX REV CODE- 272: Performed by: UROLOGY

## 2018-10-30 PROCEDURE — 76010000154 HC OR TIME FIRST 0.5 HR: Performed by: UROLOGY

## 2018-10-30 RX ORDER — ONDANSETRON 2 MG/ML
INJECTION INTRAMUSCULAR; INTRAVENOUS AS NEEDED
Status: DISCONTINUED | OUTPATIENT
Start: 2018-10-30 | End: 2018-10-30 | Stop reason: HOSPADM

## 2018-10-30 RX ORDER — PROPOFOL 10 MG/ML
INJECTION, EMULSION INTRAVENOUS AS NEEDED
Status: DISCONTINUED | OUTPATIENT
Start: 2018-10-30 | End: 2018-10-30 | Stop reason: HOSPADM

## 2018-10-30 RX ORDER — KETAMINE HYDROCHLORIDE 10 MG/ML
INJECTION, SOLUTION INTRAMUSCULAR; INTRAVENOUS AS NEEDED
Status: DISCONTINUED | OUTPATIENT
Start: 2018-10-30 | End: 2018-10-30 | Stop reason: HOSPADM

## 2018-10-30 RX ORDER — SODIUM CHLORIDE, SODIUM LACTATE, POTASSIUM CHLORIDE, CALCIUM CHLORIDE 600; 310; 30; 20 MG/100ML; MG/100ML; MG/100ML; MG/100ML
125 INJECTION, SOLUTION INTRAVENOUS CONTINUOUS
Status: DISCONTINUED | OUTPATIENT
Start: 2018-10-30 | End: 2018-10-30 | Stop reason: HOSPADM

## 2018-10-30 RX ORDER — CEFAZOLIN SODIUM/WATER 2 G/20 ML
2 SYRINGE (ML) INTRAVENOUS ONCE
Status: COMPLETED | OUTPATIENT
Start: 2018-10-30 | End: 2018-10-30

## 2018-10-30 RX ORDER — CEPHALEXIN 500 MG/1
500 CAPSULE ORAL 2 TIMES DAILY
Qty: 6 CAP | Refills: 0 | Status: SHIPPED | OUTPATIENT
Start: 2018-10-30 | End: 2018-11-02

## 2018-10-30 RX ORDER — LIDOCAINE HYDROCHLORIDE 20 MG/ML
INJECTION, SOLUTION EPIDURAL; INFILTRATION; INTRACAUDAL; PERINEURAL AS NEEDED
Status: DISCONTINUED | OUTPATIENT
Start: 2018-10-30 | End: 2018-10-30 | Stop reason: HOSPADM

## 2018-10-30 RX ADMIN — SODIUM CHLORIDE, SODIUM LACTATE, POTASSIUM CHLORIDE, AND CALCIUM CHLORIDE 125 ML/HR: 600; 310; 30; 20 INJECTION, SOLUTION INTRAVENOUS at 14:40

## 2018-10-30 RX ADMIN — SODIUM CHLORIDE, SODIUM LACTATE, POTASSIUM CHLORIDE, AND CALCIUM CHLORIDE: 600; 310; 30; 20 INJECTION, SOLUTION INTRAVENOUS at 13:39

## 2018-10-30 RX ADMIN — ONDANSETRON 4 MG: 2 INJECTION INTRAMUSCULAR; INTRAVENOUS at 13:59

## 2018-10-30 RX ADMIN — SODIUM CHLORIDE, SODIUM LACTATE, POTASSIUM CHLORIDE, AND CALCIUM CHLORIDE 125 ML/HR: 600; 310; 30; 20 INJECTION, SOLUTION INTRAVENOUS at 12:31

## 2018-10-30 RX ADMIN — KETAMINE HYDROCHLORIDE 20 MG: 10 INJECTION, SOLUTION INTRAMUSCULAR; INTRAVENOUS at 13:48

## 2018-10-30 RX ADMIN — LIDOCAINE HYDROCHLORIDE 20 MG: 20 INJECTION, SOLUTION EPIDURAL; INFILTRATION; INTRACAUDAL; PERINEURAL at 13:48

## 2018-10-30 RX ADMIN — PROPOFOL 50 MG: 10 INJECTION, EMULSION INTRAVENOUS at 13:48

## 2018-10-30 RX ADMIN — Medication 2 G: at 13:45

## 2018-10-30 RX ADMIN — SODIUM CHLORIDE 100 UNITS: 9 INJECTION INTRAMUSCULAR; INTRAVENOUS; SUBCUTANEOUS at 14:00

## 2018-10-30 NOTE — PERIOP NOTES
Reviewed PTA medication list with patient/caregiver and patient/caregiver denies any additional medications. Patient admits to having a responsible adult care for them for at least 24 hours after surgery. 
  
Permission granted by patient for a dual skin assessment. Dual skin assessment completed by Melita Ann RN and Edgard Lopez RN.

## 2018-10-30 NOTE — INTERVAL H&P NOTE
H&P Update: 
Kobe Palumbo was seen and examined. History and physical has been reviewed. The patient has been examined.  There have been no significant clinical changes since the completion of the originally dated History and Physical. 
 
Signed By: Oretha Ahumada, MD   
 October 30, 2018 1:31 PM

## 2018-10-30 NOTE — ANESTHESIA PREPROCEDURE EVALUATION
Anesthetic History No history of anesthetic complications Review of Systems / Medical History Patient summary reviewed and pertinent labs reviewed Pulmonary Sleep apnea: CPAP Neuro/Psych Cardiovascular Hypertension: well controlled Pertinent negatives: No past MI, CAD and angina Exercise tolerance: >4 METS 
  
GI/Hepatic/Renal 
  
 
 
 
 
 
 Endo/Other Hypothyroidism: well controlled Arthritis Other Findings Physical Exam 
 
Airway Mallampati: II 
TM Distance: 4 - 6 cm Neck ROM: normal range of motion Cardiovascular Rhythm: regular Rate: normal 
 
 
 
 Dental 
No notable dental hx Pulmonary Breath sounds clear to auscultation Abdominal 
GI exam deferred Other Findings Anesthetic Plan ASA: 3 Anesthesia type: MAC Anesthetic plan and risks discussed with: Patient

## 2018-10-30 NOTE — DISCHARGE INSTRUCTIONS
's office will call tomorrow to schedule a post-op visit. Drink plenty of fluids to keep the urine clear. Cystoscopy: What to Expect at [address] [place]    A cystoscopy is a procedure that lets a doctor look inside of the bladder and the urethra. The urethra is the tube that carries urine from the bladder to outside the body. The doctor uses a thin, lighted tool called a cystoscope. Your bladder is filled with fluid. This stretches the bladder so that your doctor can look closely at the inside of your bladder. After the cystoscopy, your urethra may be sore at first, and it may burn when you urinate for the first few days after the procedure. You may feel the need to urinate more often, and your urine may be pink. These symptoms should get better in 1 or 2 days. You will probably be able to go back to most of your usual activities in 1 or 2 days. This care sheet gives you a general idea about how long it will take for you to recover. But each person recovers at a different pace. Follow the steps below to get better as quickly as possible. How can you care for yourself at home? Activity    · Rest when you feel tired. Getting enough sleep will help you recover.     · Try to walk each day. Start by walking a little more than you did the day before. Bit by bit, increase the amount you walk. Walking boosts blood flow and helps prevent pneumonia and constipation.     · Avoid strenuous activities, such as bicycle riding, jogging, weight lifting, or aerobic exercise, until your doctor says it is okay.     · Ask your doctor when you can drive again.     · Most people are able to return to work within 1 or 2 days after the procedure.     · You may shower and take baths as usual.     · Ask your doctor when it is okay for you to have sex. Diet    · You can eat your normal diet.  If your stomach is upset, try bland, low-fat foods like plain rice, broiled chicken, toast, and yogurt.     · Drink plenty of fluids (unless your doctor tells you not to). Medicines    · Take pain medicines exactly as directed. ? If the doctor gave you a prescription medicine for pain, take it as prescribed. ? If you are not taking a prescription pain medicine, ask your doctor if you can take an over-the-counter medicine.     · If you think your pain medicine is making you sick to your stomach:  ? Take your medicine after meals (unless your doctor has told you not to). ? Ask your doctor for a different pain medicine.     · If your doctor prescribed antibiotics, take them as directed. Do not stop taking them just because you feel better. You need to take the full course of antibiotics. Follow-up care is a key part of your treatment and safety. Be sure to make and go to all appointments, and call your doctor if you are having problems. It's also a good idea to know your test results and keep a list of the medicines you take. When should you call for help? Call 911 anytime you think you may need emergency care. For example, call if:    · You passed out (lost consciousness).     · You have severe trouble breathing.     · You have sudden chest pain and shortness of breath, or you cough up blood.     · You have severe belly pain.    Call your doctor now or seek immediate medical care if:    · You are sick to your stomach or cannot keep fluids down.     · Your urine is still red or you see blood clots after you have urinated several times.     · You have trouble passing urine or stool, especially if you have pain or swelling in your lower belly.     · You have signs of a blood clot, such as:  ? Pain in your calf, back of the knee, thigh, or groin. ? Redness and swelling in your leg or groin.     · You develop a fever or severe chills.     · You have pain in your back just below your rib cage.  This is called flank pain.    Watch closely for changes in your health, and be sure to contact your doctor if:    · You have pain or burning when you urinate. A burning feeling is normal for a day or two after the test, but call if it does not get better.     · You have a frequent urge to urinate but can pass only small amounts of urine.     · Your urine is pink, red, or cloudy, or smells bad. It is normal for the urine to have a pinkish color for a few days after the test, but call if it does not get better. Where can you learn more? Go to http://cristobal-[facility]nille.info/. Enter D288 in the search box to learn more about \"Cystoscopy: What to Expect at Home. \"  Current as of: March 21, 2018  Content Version: 11.8  © 7102-5181 [employer]. Care instructions adapted under license by [employer] (which disclaims liability or warranty for this information). If you have questions about a medical condition or this instruction, always ask your healthcare professional. [place] [address] any warranty or liability for your use of this information. DISCHARGE SUMMARY from Nurse    PATIENT INSTRUCTIONS:    After general anesthesia or intravenous sedation, for 24 hours or while taking prescription Narcotics:  · Limit your activities  · Do not drive and operate hazardous machinery  · Do not make important personal or business decisions  · Do  not drink alcoholic beverages  · If you have not urinated within 8 hours after discharge, please contact your surgeon on call.     Report the following to your surgeon:  · Excessive pain, swelling, redness or odor of or around the surgical area  · Temperature over 100.5  · Nausea and vomiting lasting longer than 4 hours or if unable to take medications  · Any signs of decreased circulation or nerve impairment to extremity: change in color, persistent  numbness, tingling, coldness or increase pain  · Any questions    What to do at Home:  Recommended activity: Activity as tolerated and no driving for today,     If you experience any of the following symptoms as per above, please follow up with Dr. Yudelka Don at [phone]. *  Please give a list of your current medications to your Primary Care Provider. *  Please update this list whenever your medications are discontinued, doses are      changed, or new medications (including over-the-counter products) are added. *  Please carry medication information at all times in case of emergency situations. These are general instructions for a healthy lifestyle:    No smoking/ No tobacco products/ Avoid exposure to second hand smoke  Surgeon General's Warning:  Quitting smoking now greatly reduces serious risk to your health. Obesity, smoking, and sedentary lifestyle greatly increases your risk for illness    A healthy diet, regular physical exercise & weight monitoring are important for maintaining a healthy lifestyle    You may be retaining fluid if you have a history of heart failure or if you experience any of the following symptoms:  Weight gain of 3 pounds or more overnight or 5 pounds in a week, increased swelling in our hands or feet or shortness of breath while lying flat in bed. Please call your doctor as soon as you notice any of these symptoms; do not wait until your next office visit. Recognize signs and symptoms of STROKE:    F-face looks uneven    A-arms unable to move or move unevenly    S-speech slurred or non-existent    T-time-call 911 as soon as signs and symptoms begin-DO NOT go       Back to bed or wait to see if you get better-TIME IS BRAIN. Warning Signs of HEART ATTACK     Call 911 if you have these symptoms:   Chest discomfort. Most heart attacks involve discomfort in the center of the chest that lasts more than a few minutes, or that goes away and comes back. It can feel like uncomfortable pressure, squeezing, fullness, or pain.  Discomfort in other areas of the upper body. Symptoms can include pain or discomfort in one or both arms, the back, neck, jaw, or stomach.    Shortness of breath with or without chest discomfort.  Other signs may include breaking out in a cold sweat, nausea, or lightheadedness. Don't wait more than five minutes to call 911 - MINUTES MATTER! Fast action can save your life. Calling 911 is almost always the fastest way to get lifesaving treatment. Emergency Medical Services staff can begin treatment when they arrive -- up to an hour sooner than if someone gets to the hospital by car. Patient armband removed and shredded  The discharge information has been reviewed with the patient and caregiver. The patient and caregiver verbalized understanding. Discharge medications reviewed with the patient and caregiver and appropriate educational materials and side effects teaching were provided.   ___________________________________________________________________________________________________________________________________

## 2018-10-30 NOTE — OP NOTES
DATE OF SERVICE: 10/9/2018     PREOPERATIVE DIAGNOSIS:  Urge incontinence, urinary frequency     POSTOPERATIVE DIAGNOSIS:  Urge incontinence, urinary frequency     PROCEDURE PERFORMED:  Cystoscopy, and Botox injection of the bladder, 100 units.       SURGEON: Darling Woods MD      ASSISTANT:  None.     ANESTHESIA:  MAC.       ESTIMATED BLOOD LOSS:  Minimal.     SPECIMENS REMOVED:  None      FINDINGS:  100 units of Botox were injected without problem.     COMPLICATIONS:  None.     IMPLANTS:  None.     CLINICAL NOTE:  The patient is an63-year-old female with urge incontinence and frequency.  She presents for Botox.       OPERATIVE REPORT:  Preoperatively, risks and benefits of surgery were described to the patient.  The risks include, but are not limited to bleeding, infection, injury, urinary retention. The patient understood the risks and signed the informed consent.      The patient was taken to the operating room and placed on the OR table in supine position.  She was administered a MAC anesthetic.  She was asministered anitbiotics and then placed into lithotomy position.  She was prepped and draped in a sterile manner.  A 21-[place] cystoscope sheath was then inserted transurethrally atraumatically under direct vision using a 30-degree lens.  Panendoscopy was done.  The bilateral ureteral orifices were normal in anatomic position.  There were no stones, no tumors, no areas of concerns within the bladder.  The bladder was then injected with Botox; 100 units were divided into 30 mL, 1 mL was injected at 30 different spots scattered throughout the bladder.  Care was taken not to inject at the trigone or near the ureteral orifices at all.  After all the injections were made into the detrusor muscle, the bladder was emptied.  The patient was then taken out of lithotomy position, revived from anesthesia, and taken to recovery in stable condition.  Melisa Kohler MD

## 2018-10-30 NOTE — INTERVAL H&P NOTE
H&P Update: 
Kulwant Valencia was seen and examined. History and physical has been reviewed. The patient has been examined.  There have been no significant clinical changes since the completion of the originally dated History and Physical. 
 
Signed By: Brianna Dale MD   
 October 30, 2018 1:35 PM

## 2018-10-30 NOTE — H&P
Urology [address]  [address] Tel: [phone] Fax: [phone] Patient: Dinesh Hernandez This 76year old  patient was referred by Jerardo Sutton. Completed Orders (this encounter) Order Interpretation Result Next Lab Date URINALYSIS NONAUTO W/O SCOPE see detail Test 1Color: yellow. Clarity: clear. Glucose: normal.  Bilirubin: negative. Ketones: negative. Specific Gravity: 1.015. Blood: negative. pH: 5.0. Protein: negative. Urobilinogen: normal.  Nitrite: negative. Leukocytes: 25. Assessment/Plan # Detail Type Description 1. Assessment Urge incontinence (N39.41). Patient Plan She is not doing any better on the Interstim anymore. It's only improved her down fro 8 pads to about 6 anymore. We discussed Interstim revision vs Botox. She wishes to proceed with Botox. We will do 100 units. Risk of bleeding, infection, failure and urinary retention were discussed. We also discussed that the Botox will eventually wear off.  
    
 2. Assessment Acute cystitis with hematuria (N30.01). Plan Orders The patient had the following test(s) completed today: URINALYSIS NONAUTO W/O SCOPE.  
    
 3. Other Orders Orders not associated to today's assessments. Plan Orders Further diagnostic evaluations ordered today include(s) ELECTROCARDIOGRAM, COMPLETE to be performed. This 76year old female presents for Overactive Bladder. History of Present Illness: 1. Overactive Bladder Onset was gradual. Severity level is moderate-severe. It occurs constantly. The problem is worse. Associated symptoms include incomplete emptying, nocturia (1 times per night), urgency, urinary incontinence (mostly urge, small RONDA) and urinary frequency (every 2 hours). Pertinent negatives include chills, constipation, fever and hematuria. Additional information: She has failed ditropan detrol, and Vesicare.    She wears 3-4 pads per day. Myrbetriq improved things to 2-3 pads per day initially. She had stage 1 interstim placed and was down to 1 pad and Q3 hr freq. On stage 2 interstim freq is down to Q3hrs, nocturia x1. She did well until 3 months ago. Now she is at 6-7 pads/day. Unchanged since starting Myrbetriq in addition to the interstim. Increasing the settings and changing programs have been unhelpful. Gurpreet Blackwell 7/2017: With the interstim off, she is now on  nocturia  2 and 7-8 pads per day. No UTI''s. No pain or dysuria, but s eis very frustrated 8/2017 -- She is 2 weeks s/p inerstim revision. Her lead was no longer making good contact and the lead and generator were replaced. She feels fine w/o fever or problem. However, her symptoms are unchanged at program 3 with amplitude of 1.0. 
 
10/2/17 -- She has cycled through multiple programs and she hasn''t had any success. She is now on program 3 at 4.5 amplitude. She wears 5 pads per day. 10/19/17 -- she is now program 1 at amplitude 5.5 and her incontiinence is down to 3 pads per day. No pain or radiation or dysuria. 10/2018 -- Her son recently passed. She is miserable with her issues. She is better at night, but she wears 6 pads per day and leaks by day. She really wishes things were better. no UTI.  no pain or radiation. Aleways urge, no RONDA. PAST MEDICAL/SURGICAL HISTORY   (Reviewed, updated) Disease/disorder Onset Date Management Date Comments Closed fracture of lateral malleolus 07/22/2015 Explant oif interstim lead and generator, interstim stage 1 placement of new lead, stage 2 placement of new generator, intraoperative programming, fluoro use 08/08/2017 Fibroids  JOSE J/BSO    
  lacrimal gland repair?    
overactive bladder  interstim Thyroid disease Cataract extraction PROBLEM LIST:   Problem List reviewed. Problem Description Onset Date Chronic Clinical Status Notes Osteopenia  Y    
 Sleep apnea  Y Insomnia 08/10/2011 Y Urgent desire to urinate 08/10/2011 Y Benign essential hypertension 04/08/2011 Y Hyperlipidemia 04/08/2011 Y Hypothyroidism 04/08/2011 Y Low compliance bladder 04/08/2011 Y Disorder of bone and articular cartilage 04/08/2011 Y Localized, primary osteoarthritis of the shoulder region 06/23/2011 Y Medications (active prior to today) Medication Instructions Start Date Stop Date Refilled Elsewhere  
aspirin 81 mg tablet,delayed release take 1 tablet (81MG)  by oral route  every day 05/18/2016 05/18/2016 N Calcium 600 + D(3) 600 mg (1,500 mg)-400 unit tablet 1 tab po daily. 05/18/2016 05/18/2016 N  
[phone] 05/09/2017   N  
simvastatin 20 mg tablet take 1 tablet by oral route  every day in the evening 07/05/2017 07/05/2017 N  
trazodone 100 mg tablet take 1 tablet by oral route  every day at bedtime 07/05/2017 07/05/2017 N Synthroid 50 mcg tablet take 1 tablet (50MCG)  by oral route  every day 10/10/2018  10/10/2018 N  
lisinopril 20 mg tablet take 1.5  tablet by oral route  every day 10/10/2018  10/10/2018 N Medication Reconciliation Medications reconciled today. Medication Reviewed Adherence Medication Name Sig Desc Elsewhere Status  
taking as directed trazodone 100 mg tablet take 1 tablet by oral route  every day at bedtime N Verified  
taking as directed simvastatin 20 mg tablet take 1 tablet by oral route  every day in the evening N Verified  
taking as directed Synthroid 50 mcg tablet take 1 tablet (50MCG)  by oral route  every day N Verified  
taking as directed Calcium 600 + D(3) 600 mg (1,500 mg)-400 unit tablet 1 tab po daily. N Verified  
taking as directed aspirin 81 mg tablet,delayed release take 1 tablet (81MG)  by oral route  every day N Verified  
taking as directed CPAP  INHALATION GAS [facility] FAX# [phone] N Verified taking as directed lisinopril 20 mg tablet take 1.5  tablet by oral route  every day N Verified Allergies Ingredient Reaction (Severity) Medication Name Comment NO KNOWN ALLERGIES Reviewed, no changes. Family History  (Reviewed, updated) Relationship Family Member Name  Age at Death Condition Onset Age Cause of Death Father  N  Coronary artery disease  N Family History Comments Relationship Family Member Name Condition Comments Father  Coronary artery disease at 74y Social History:  (Reviewed, updated) Tobacco use reviewed. Preferred language is [place]. EDUCATION/EMPLOYMENT/OCCUPATION Employment History Status Retired Restrictions Retired     
 retired school bus asst     
 
MARITAL STATUS/FAMILY/SOCIAL SUPPORT Currently . CHILDREN Has children:  2 son(s). Tobacco use status: Cigarette smoker. Smoking status: Former smoker. SMOKING STATUS Use Status Type Smoking Status Usage Per Day Years Used Total Pack Years  
yes Cigarette Former smoker  10.00 TOBACCO/VAPING EXPOSURE No passive smoke exposure. ALCOHOL There is a history of alcohol use. Type: Wine. consumed rarely. CAFFEINE The patient uses caffeine: coffee - 2 cups a day. LIFESTYLE Exercises 2-3 times a week. Confucianism/SPIRITUAL Patient agrees to transfusion. HOME ENVIRONMENT/SAFETY The home has smoke detectors. Uses seat belts. Review of Systems System Neg/Pos Details Constitutional Negative Chills and Fever. ENMT Negative Ear infections and Sore throat. Eyes Negative Blurred vision, Double vision and Eye pain. Respiratory Negative Asthma, Chronic cough, Dyspnea and Wheezing. Cardio Negative Chest pain. GI Negative Constipation, Decreased appetite, Diarrhea, Nausea and Vomiting.  Positive Incomplete emptying, Nocturia, Urgency, Urinary frequency, Urinary incontinence.  Negative Hematuria. Endocrine Negative Cold intolerance, Heat intolerance, Increased thirst and Weight loss. Neuro Negative Headache and Tremors. Psych Negative Anxiety and Depression. Integumentary Negative Itching skin and Rash. MS Negative Back pain and Joint pain. Hema/Lymph Negative Easy bleeding. Physical Exam 
Exam Findings Details Constitutional Normal Well developed. Neck Exam Normal Inspection - Normal.  
Respiratory Normal Inspection - Normal.  
Abdomen Normal No abdominal tenderness. Genitourinary Normal No Suprapubic tenderness. No CVA tenderness. Extremity Normal No Edema. Psychiatric Normal Orientation - Oriented to time, place, person & situation. Appropriate mood and affect. Patient Education # Patient Education 1. Bladder Training: Care Instructions Medications (added, continued, or stopped today) Start Date Medication Directions PRN Status PRN Reason Instruction Stop Date  
05/18/2016 aspirin 81 mg tablet,delayed release take 1 tablet (81MG)  by oral route  every day N     
05/18/2016 Calcium 600 + D(3) 600 mg (1,500 mg)-400 unit tablet 1 tab po daily. N     
05/09/2017 CPAP  INHALATION GAS [facility] FAX# [phone] N     
10/10/2018 lisinopril 20 mg tablet take 1.5  tablet by oral route  every day N     
07/05/2017 simvastatin 20 mg tablet take 1 tablet by oral route  every day in the evening N     
10/10/2018 Synthroid 50 mcg tablet take 1 tablet (50MCG)  by oral route  every day N     
07/05/2017 trazodone 100 mg tablet take 1 tablet by oral route  every day at bedtime N     
Urine Dipstick: 
Status Interpretation Result  
completed see detail Test 1Color: yellow. Clarity: clear. Glucose: normal.  Bilirubin: negative. Ketones: negative. Specific Gravity: 1.015. Blood: negative. pH: 5.0. Protein: negative. Urobilinogen: normal.  Nitrite: negative. Leukocytes: 25.  
Active Patient Care Team Members Name Contact Agency Type Support Role Relationship Active Date Inactive Date Specialty Erasmo Manzo   Patient provider PCP   Family Practice Erasmo Manzo   primary care provider    Family Pract LILIAN HARTMAN    Spouse Henna Eduardo   encounter provider    Urology Provider:  
 
 
Clifford Gonsales MD

## 2018-10-31 ENCOUNTER — DOCUMENTATION ONLY (OUTPATIENT)
Dept: PULMONOLOGY | Age: 78
End: 2018-10-31

## 2018-10-31 NOTE — PROGRESS NOTES
PATIENT:       Willard Chery   YOB: 1940  DATE:       10/31/2018 01:45 PM  VISIT TYPE:       Office Visit      Assessment/Plan  # Detail Type Description    1. Assessment Sleep apnea (G47.33). Impression Snoring witness apnea no Kerwin daytime sleepiness and unknown severity of sleep apnea. -Home sleep study was done on March 2017 received the report from the was moderate sleep apnea with AHI of 18 time below 90% for 4.5 minutes average heart rate of 56    -Moderate sleep apnea AHI of 18  -Currently on auto CPAP 5-20  -Issues tolerating the pressure especially with a ramp and increasing the pressure  -AHI is running 7 on current auto CPAP. -Receiving supplies as plan    Plan:  -Dedicated CPAP titration study due to ongoing issues tolerating auto CPAP and AHI of 7 on current CPAP. -Follow-up in 2 months sooner after the CPAP study and adjust the CPAP pressure. 9-:  -CPAP titration showed on CPAP of 10 and 11 both AHI reduced to 2  -Currently on auto CPAP patient is complaining or pressure intolerance and AHI still remaining around 10    10/31/2018:  -Came for follow-up for long time  -As per the patient pressure is still too much at times leaks  -Last time was seen in September 2017 at that time he was supposed to switch auto CPAP to fix pressure CPAP however it has not been done yet  -Currently using CPAP 90% of the time, mean pressure is 14 max pressure is 17 with AHI his remaining 6  -Prior CPAP titrations reviewed with suggested that CPAP of 10/11 both were adequate, patient also complains of a lot of leak however ESS is improved to 2    Plan:  -Change the CPAP to fix pressure CPAP of 10 with a ramp  -Continue with current supplies  -Follow-up in about 6 months with compliance download(patient is going to be going to [place] by end of November). Patient Plan Obstructive Sleep Apnea (RAQUEL).        a.  The consequences of sleep apnea; cardiovascular disease (increase risk of stroke, heart disease, hypertension), cognitive difficulties (attention, memory, concentration difficulties), endocrine difficulties (insulin resistance and possible increase risk of diabetes) and vegetative symptoms (sleepiness, fatigue) were discussed with the patient. b.  The patient was warned that sleep apnea is associated with increase risk of motor vehicle accidents due to daytime sleepiness. c.  The pathophysiology of sleep apnea and different treatment modalities (CPAP, surgery, dental appliance, weight reduction, positional therapy, medications) were discussed. Plan Orders Chinstrap, CPAP Mask of Choice, CPAP with expiratory relief technology, Disposable Filters, FFM, FFM Cushion, Headgear, Heated Humidity, Heated wire tubing, Nasal Cushion Replacement, Nasal Mask, Nasal Pillow Replacement, Non-disposable filters, Tubing and Water chamber. 2. Assessment Bronchitis (J40). Impression -Recently been diagnosed by PCP  -No cough or wheezing or other symptoms    Plan:  -Continue close follow-up with PCP if further symptoms we'll consider PFT and other workup. 3. Assessment Body mass index (BMI) 28.0-28.9, adult (P73.54). Plan  -Change to fix pressure CPAP of 10 and follow-up in 2 months with compliance download    I reviewed patient's prior studies available to me including sleep studies, compliance download, PFT if available, chest x-ray if available. I reviewed patient's medication and adjusted the medication. All the questions related to patient's management plan discussed and answered to patient's satisfaction. I spent 15 minutes of my time, more than 50% of time spent on face-to-face evaluation, for diagnosis pathology and discussion of various treatment options patient understood the plan all questions were on start.   Voice-recognition software may have been used to generate this report, which may have resulted in some phonetic-based errors in grammar and contents. Even though attempts were made to correct all the mistakes, some may have been missed, and remained in the body of the document. This 76year old female presents for HPI, Sleep Apnea (follow up) and CPAP. History of Present Illness:  1.  HPI   Patient is a very pleasant female who has a history of snoring witness apnea daytime fatigue usually goes to bed around 11 weeks up at 8 o'clock in the morning currently retired no issues going to sleep or staying asleep of some \"\" history of insomnia and was on trazodone but she denies any symptoms right now no history or symptoms of restless leg syndrome or narcolepsy [place] sleepiness score is 0-3 out of 24. Patient had a recent home sleep study done looks like in [place] did not have study report with us currently on auto CPAP 5-20 complains that pressure is too high and she has a hard time tolerating it. Patient also has some issues with the leak.     August 11 2017:  -Patient is feeling okay  -Still complains of issues with the pressure wakes her up at nighttime she feels that pressure is going up and causing her to have significant issues  -Still on auto CPAP 5-2000% compliance however only 54% of time of 04 hours mean pressure of 12 Pressure of 15 AHI still remaining 7.  -Home sleep study was done on March 2017 received the report from the was moderate sleep apnea with AHI of 18 time below 90% for 4.5 minutes average heart rate of 56    9-:  -Came for follow-up of a CPAP titration  -No issues with current CPAP from tolerance except she feels that pressures at times too high results and leak and difficult to tolerate  -Currently set on auto set 5-20 mean pressure is 10 max pressure is 14 AHI his remaining around 10  -Tells me that she was recently been diagnosed with some bronchitis and received treatment through PCP  -Denies any cough or wheezing ex-smoker less than 5-pack-year no other symptoms    10/31/2018:  -Came for follow-up for long time  -As per the patient pressure is still too much at times leaks  -Last time was seen in September 2017 at that time he was supposed to switch auto CPAP to fix pressure CPAP however it has not been done yet    -CPAP titration study done on August 2017  -CPAP of 10 AHI reduced to 2 REM and REM supine sleep was noted with average oxygen of 97%  -Other optimal pressure CPAP of 11  -Respironics Sarika Full Face

## 2018-11-05 NOTE — ANESTHESIA POSTPROCEDURE EVALUATION
Procedure(s): 
CYSTOSCOPY BOTOX INJECTION TO BLADDER (100 UNITS) . Anesthesia Post Evaluation Multimodal analgesia: multimodal analgesia used between 6 hours prior to anesthesia start to PACU discharge Patient location during evaluation: bedside Patient participation: complete - patient participated Level of consciousness: awake Pain score: 0 Pain management: satisfactory to patient Airway patency: patent Anesthetic complications: no 
Cardiovascular status: acceptable Respiratory status: acceptable Hydration status: acceptable Visit Vitals /62 Pulse 63 Temp 36.7 °C (98 °F) Resp 16 Ht 5' (1.524 m) Wt 65.5 kg (144 lb 6 oz) SpO2 95% BMI 28.20 kg/m²

## 2019-07-17 ENCOUNTER — HOSPITAL ENCOUNTER (OUTPATIENT)
Dept: PREADMISSION TESTING | Age: 79
Discharge: HOME | End: 2019-07-17
Payer: MEDICARE

## 2019-07-17 VITALS — BODY MASS INDEX: 31.07 KG/M2 | WEIGHT: 148 LBS | HEIGHT: 58 IN

## 2019-07-17 DIAGNOSIS — I10 ESSENTIAL HYPERTENSION, MALIGNANT: ICD-10-CM

## 2019-07-17 LAB
ANION GAP SERPL CALC-SCNC: 6 MMOL/L (ref 3–18)
ATRIAL RATE: 60 BPM
BUN SERPL-MCNC: 24 MG/DL (ref 7–18)
BUN/CREAT SERPL: 23 (ref 12–20)
CALCIUM SERPL-MCNC: 9.4 MG/DL (ref 8.5–10.1)
CALCULATED P AXIS, ECG09: 76 DEGREES
CALCULATED R AXIS, ECG10: 37 DEGREES
CALCULATED T AXIS, ECG11: 67 DEGREES
CHLORIDE SERPL-SCNC: 107 MMOL/L (ref 100–108)
CO2 SERPL-SCNC: 29 MMOL/L (ref 21–32)
CREAT SERPL-MCNC: 1.03 MG/DL (ref 0.6–1.3)
DIAGNOSIS, 93000: NORMAL
GLUCOSE SERPL-MCNC: 126 MG/DL (ref 74–99)
HCT VFR BLD AUTO: 39 % (ref 35–45)
HGB BLD-MCNC: 12.2 G/DL (ref 12–16)
P-R INTERVAL, ECG05: 158 MS
POTASSIUM SERPL-SCNC: 4 MMOL/L (ref 3.5–5.5)
Q-T INTERVAL, ECG07: 430 MS
QRS DURATION, ECG06: 98 MS
QTC CALCULATION (BEZET), ECG08: 430 MS
SODIUM SERPL-SCNC: 142 MMOL/L (ref 136–145)
VENTRICULAR RATE, ECG03: 60 BPM

## 2019-07-17 PROCEDURE — 93005 ELECTROCARDIOGRAM TRACING: CPT

## 2019-07-17 PROCEDURE — 85018 HEMOGLOBIN: CPT

## 2019-07-17 PROCEDURE — 80048 BASIC METABOLIC PNL TOTAL CA: CPT

## 2019-07-17 PROCEDURE — 36415 COLL VENOUS BLD VENIPUNCTURE: CPT

## 2019-07-17 RX ORDER — CEFAZOLIN SODIUM/WATER 2 G/20 ML
2 SYRINGE (ML) INTRAVENOUS ONCE
Status: CANCELLED | OUTPATIENT
Start: 2019-07-17 | End: 2019-07-17

## 2019-07-17 RX ORDER — SODIUM CHLORIDE, SODIUM LACTATE, POTASSIUM CHLORIDE, CALCIUM CHLORIDE 600; 310; 30; 20 MG/100ML; MG/100ML; MG/100ML; MG/100ML
125 INJECTION, SOLUTION INTRAVENOUS CONTINUOUS
Status: CANCELLED | OUTPATIENT
Start: 2019-07-17

## 2019-08-05 ENCOUNTER — ANESTHESIA EVENT (OUTPATIENT)
Dept: SURGERY | Age: 79
End: 2019-08-05
Payer: MEDICARE

## 2019-08-05 RX ORDER — NALOXONE HYDROCHLORIDE 0.4 MG/ML
0.1 INJECTION, SOLUTION INTRAMUSCULAR; INTRAVENOUS; SUBCUTANEOUS AS NEEDED
Status: CANCELLED | OUTPATIENT
Start: 2019-08-05

## 2019-08-05 RX ORDER — FLUMAZENIL 0.1 MG/ML
0.2 INJECTION INTRAVENOUS
Status: CANCELLED | OUTPATIENT
Start: 2019-08-05

## 2019-08-05 RX ORDER — SODIUM CHLORIDE 0.9 % (FLUSH) 0.9 %
5-40 SYRINGE (ML) INJECTION AS NEEDED
Status: CANCELLED | OUTPATIENT
Start: 2019-08-05

## 2019-08-05 RX ORDER — FENTANYL CITRATE 50 UG/ML
25 INJECTION, SOLUTION INTRAMUSCULAR; INTRAVENOUS AS NEEDED
Status: CANCELLED | OUTPATIENT
Start: 2019-08-05

## 2019-08-05 RX ORDER — MAGNESIUM SULFATE 100 %
4 CRYSTALS MISCELLANEOUS AS NEEDED
Status: CANCELLED | OUTPATIENT
Start: 2019-08-05

## 2019-08-05 RX ORDER — SODIUM CHLORIDE 0.9 % (FLUSH) 0.9 %
5-40 SYRINGE (ML) INJECTION EVERY 8 HOURS
Status: CANCELLED | OUTPATIENT
Start: 2019-08-05

## 2019-08-05 RX ORDER — HYDROMORPHONE HYDROCHLORIDE 2 MG/ML
0.5 INJECTION, SOLUTION INTRAMUSCULAR; INTRAVENOUS; SUBCUTANEOUS
Status: CANCELLED | OUTPATIENT
Start: 2019-08-05

## 2019-08-05 RX ORDER — SODIUM CHLORIDE, SODIUM LACTATE, POTASSIUM CHLORIDE, CALCIUM CHLORIDE 600; 310; 30; 20 MG/100ML; MG/100ML; MG/100ML; MG/100ML
1000 INJECTION, SOLUTION INTRAVENOUS CONTINUOUS
Status: CANCELLED | OUTPATIENT
Start: 2019-08-05

## 2019-08-06 ENCOUNTER — HOSPITAL ENCOUNTER (OUTPATIENT)
Age: 79
Setting detail: OUTPATIENT SURGERY
Discharge: HOME | End: 2019-08-06
Attending: UROLOGY | Admitting: UROLOGY
Payer: MEDICARE

## 2019-08-06 ENCOUNTER — ANESTHESIA (OUTPATIENT)
Dept: SURGERY | Age: 79
End: 2019-08-06
Payer: MEDICARE

## 2019-08-06 VITALS
HEIGHT: 60 IN | BODY MASS INDEX: 28.97 KG/M2 | SYSTOLIC BLOOD PRESSURE: 136 MMHG | TEMPERATURE: 97.8 F | OXYGEN SATURATION: 100 % | DIASTOLIC BLOOD PRESSURE: 55 MMHG | WEIGHT: 147.56 LBS | RESPIRATION RATE: 12 BRPM | HEART RATE: 60 BPM

## 2019-08-06 PROCEDURE — 77030020268 HC MISC GENERAL SUPPLY: Performed by: UROLOGY

## 2019-08-06 PROCEDURE — 76060000031 HC ANESTHESIA FIRST 0.5 HR: Performed by: UROLOGY

## 2019-08-06 PROCEDURE — 74011250636 HC RX REV CODE- 250/636

## 2019-08-06 PROCEDURE — 76010000154 HC OR TIME FIRST 0.5 HR: Performed by: UROLOGY

## 2019-08-06 PROCEDURE — 74011250636 HC RX REV CODE- 250/636: Performed by: UROLOGY

## 2019-08-06 PROCEDURE — 76210000021 HC REC RM PH II 0.5 TO 1 HR: Performed by: UROLOGY

## 2019-08-06 PROCEDURE — 77030018831 HC SOL IRR H20 BAXT -A: Performed by: UROLOGY

## 2019-08-06 PROCEDURE — 77030018836 HC SOL IRR NACL ICUM -A: Performed by: UROLOGY

## 2019-08-06 PROCEDURE — 77030040361 HC SLV COMPR DVT MDII -B: Performed by: UROLOGY

## 2019-08-06 PROCEDURE — 77030020782 HC GWN BAIR PAWS FLX 3M -B: Performed by: UROLOGY

## 2019-08-06 RX ORDER — CEFAZOLIN SODIUM/WATER 2 G/20 ML
2 SYRINGE (ML) INTRAVENOUS ONCE
Status: COMPLETED | OUTPATIENT
Start: 2019-08-06 | End: 2019-08-06

## 2019-08-06 RX ORDER — FENTANYL CITRATE 50 UG/ML
INJECTION, SOLUTION INTRAMUSCULAR; INTRAVENOUS AS NEEDED
Status: DISCONTINUED | OUTPATIENT
Start: 2019-08-06 | End: 2019-08-06 | Stop reason: HOSPADM

## 2019-08-06 RX ORDER — PROPOFOL 10 MG/ML
INJECTION, EMULSION INTRAVENOUS AS NEEDED
Status: DISCONTINUED | OUTPATIENT
Start: 2019-08-06 | End: 2019-08-06 | Stop reason: HOSPADM

## 2019-08-06 RX ORDER — ONDANSETRON 2 MG/ML
INJECTION INTRAMUSCULAR; INTRAVENOUS AS NEEDED
Status: DISCONTINUED | OUTPATIENT
Start: 2019-08-06 | End: 2019-08-06 | Stop reason: HOSPADM

## 2019-08-06 RX ORDER — LIDOCAINE HYDROCHLORIDE 20 MG/ML
INJECTION, SOLUTION EPIDURAL; INFILTRATION; INTRACAUDAL; PERINEURAL AS NEEDED
Status: DISCONTINUED | OUTPATIENT
Start: 2019-08-06 | End: 2019-08-06 | Stop reason: HOSPADM

## 2019-08-06 RX ORDER — SODIUM CHLORIDE, SODIUM LACTATE, POTASSIUM CHLORIDE, CALCIUM CHLORIDE 600; 310; 30; 20 MG/100ML; MG/100ML; MG/100ML; MG/100ML
125 INJECTION, SOLUTION INTRAVENOUS CONTINUOUS
Status: DISCONTINUED | OUTPATIENT
Start: 2019-08-06 | End: 2019-08-06 | Stop reason: HOSPADM

## 2019-08-06 RX ADMIN — PROPOFOL 10 MG: 10 INJECTION, EMULSION INTRAVENOUS at 11:52

## 2019-08-06 RX ADMIN — FENTANYL CITRATE 25 MCG: 50 INJECTION, SOLUTION INTRAMUSCULAR; INTRAVENOUS at 11:50

## 2019-08-06 RX ADMIN — LIDOCAINE HYDROCHLORIDE 20 MG: 20 INJECTION, SOLUTION EPIDURAL; INFILTRATION; INTRACAUDAL; PERINEURAL at 11:47

## 2019-08-06 RX ADMIN — Medication 2 G: at 11:47

## 2019-08-06 RX ADMIN — SODIUM CHLORIDE, SODIUM LACTATE, POTASSIUM CHLORIDE, AND CALCIUM CHLORIDE 125 ML/HR: 600; 310; 30; 20 INJECTION, SOLUTION INTRAVENOUS at 10:05

## 2019-08-06 RX ADMIN — SODIUM CHLORIDE, SODIUM LACTATE, POTASSIUM CHLORIDE, AND CALCIUM CHLORIDE: 600; 310; 30; 20 INJECTION, SOLUTION INTRAVENOUS at 12:03

## 2019-08-06 RX ADMIN — ONDANSETRON 4 MG: 2 INJECTION INTRAMUSCULAR; INTRAVENOUS at 11:50

## 2019-08-06 RX ADMIN — PROPOFOL 20 MG: 10 INJECTION, EMULSION INTRAVENOUS at 11:50

## 2019-08-06 RX ADMIN — PROPOFOL 20 MG: 10 INJECTION, EMULSION INTRAVENOUS at 11:47

## 2019-08-06 NOTE — H&P
Urology [address] [place] [address] [place]  [address]  Tel: [phone]  Fax: [phone]        Patient: Naldo John  YOB: 1940          Completed Orders (this encounter)  Order Interpretation Result Next Lab Date   URINALYSIS, AUTO, W/O SCOPE see detail Test 1Color: yellow. Clarity: clear. Glucose: negative. Bilirubin: negative. Ketones: negative. Specific Gravity: 1.030. Blood: negative. pH: 7.0. Protein: negative. Urobilinogen: 0.2 mg/dl  Nitrite: negative. Leukocytes: negative. Assessment/Plan  # Detail Type Description    1. Assessment Frequency of micturition (R35.0). Plan Orders The patient had the following test(s) completed today: URINALYSIS, AUTO, W/O SCOPE. 2. Assessment Urge incontinence (N39.41). Patient Plan Overall she is worse than she was in Nov but still improved compared to her baseline. We discussed repeat Botox 100 units. Risk of bleeding, infection and retention were discussed. She will proceed. 3. Assessment Feeling of incomplete bladder emptying (R39.14). Patient Plan Her PVR is minimal.         4. Assessment Acute cystitis with hematuria (N30.01). Patient Plan Her UA is completely clear. This [de-identified]year old female presents for Overactive Bladder. History of Present Illness:  1. Overactive Bladder   Onset was gradual. Severity level is moderate-severe. It occurs constantly. The problem is worse. Associated symptoms include incomplete emptying, nocturia (1 times per night), urgency, urinary incontinence (mostly urge, small RONDA) and urinary frequency (every 2 hours). Pertinent negatives include chills, constipation, fever and hematuria. Additional information: She has failed ditropan detrol, and Vesicare. She wears 3-4 pads per day. Myrbetriq improved things to 2-3 pads per day initially. She had stage 1 interstim placed and was down to 1 pad and Q3 hr freq.    On stage 2 interstim freq is down to Q3hrs, nocturia x1. She did well until 3 months ago. Now she is at 6-7 pads/day. Unchanged since starting Myrbetriq in addition to the interstim. Increasing the settings and changing programs have been unhelpful. .       7/2017: With the interstim off, she is now on  nocturia  2 and 7-8 pads per day. No UTI''''''''s. No pain or dysuria, but s eis very frustrated     8/2017 -- She is 2 weeks s/p inerstim revision. Her lead was no longer making good contact and the lead and generator were replaced. She feels fine w/o fever or problem. However, her symptoms are unchanged at program 3 with amplitude of 1.0.    10/2/17 -- She has cycled through multiple programs and she hasn''''''''t had any success. She is now on program 3 at 4.5 amplitude. She wears 5 pads per day. 10/19/17 -- she is now program 1 at amplitude 5.5 and her incontiinence is down to 3 pads per day. No pain or radiation or dysuria. 10/2018 -- Her son recently passed. She is miserable with her issues. She is better at night, but she wears 6 pads per day and leaks by day. She really wishes things were better. no UTI.  no pain or radiation. Aleways urge, no RONDA.    11/21/18 - 1 month s/p 100 units botox and she feels great. It took 10 days to begin to take effect. No urgency. Nocturia x 1. No incontinence. No trouble voiding. No SP pain or rpessure or radiation. 6/7/19 - She is now getting worse. No UTI. No dysuriqa. She is back to wearing pads 3 per day. A little more urgency. no hematuria or dysuria. More noctruia lately. No SP pain or radiation or agravating factors. Medical/Surgical/Interim History  Reviewed, no change. Last detailed document date:05/29/2019. PROBLEM LIST:   Problem List reviewed.    Problem Description Onset Date Chronic Clinical Status Notes   Osteopenia  Y     Sleep apnea  Y     Insomnia 08/10/2011 Y     Urgent desire to urinate 08/10/2011 Y     Benign essential hypertension 04/08/2011 Y     Hyperlipidemia 04/08/2011 Y     Hypothyroidism 04/08/2011 N     Low compliance bladder 04/08/2011 Y     Disorder of bone and articular cartilage 04/08/2011 Y     Localized, primary osteoarthritis of the shoulder region 06/23/2011 Y           Medications (active prior to today)  Medication Instructions Start Date Stop Date Refilled Elsewhere   CPAP  INHALATION GAS Sharl Lips FAX# [phone] 05/09/2017   N   aspirin 81 mg tablet,delayed release take 1 tablet (81MG)  by oral route  every day 10/18/2018  10/18/2018 N   Calcium 600 + D(3) 600 mg (1,500 mg)-400 unit tablet 1 tab po daily. 10/18/2018  10/18/2018 N   trazodone 100 mg tablet take 1 tablet by oral route  every day at bedtime 10/18/2018  10/18/2018 N   simvastatin 20 mg tablet take 1 tablet by oral route  every day in the evening 05/14/2019 05/14/2019 N   biotin 10 mg tablet  //   Y   fexofenadine 180 mg tablet take 1 tablet by oral route  every day //   Y   Co Q-10 10 mg capsule  //   Y   Centrum Silver Women 8 mg iron-400 mcg-300 mcg tablet  //   Y   B12 5,000 mcg-100 mcg sublingual lozenge  //   Y   Synthroid 50 mcg tablet take 1 tablet by oral route  every day 05/29/2019 05/29/2019 N   lisinopril 20 mg tablet take 11/2 tablet by oral route  every day 05/29/2019 05/29/2019 N     Medication Reconciliation  Medications reconciled today. Medication Reviewed  Adherence Medication Name Sig Desc Elsewhere Status   taking as directed CPAP  INHALATION GAS Sharl Lips FAX# [phone] N Verified   taking as directed aspirin 81 mg tablet,delayed release take 1 tablet (81MG)  by oral route  every day N Verified   taking as directed Calcium 600 + D(3) 600 mg (1,500 mg)-400 unit tablet 1 tab po daily.  N Verified   taking as directed trazodone 100 mg tablet take 1 tablet by oral route  every day at bedtime N Verified   taking as directed simvastatin 20 mg tablet take 1 tablet by oral route  every day in the evening N Verified   taking as directed biotin 10 mg tablet  Y Verified   taking as directed fexofenadine 180 mg tablet take 1 tablet by oral route  every day Y Verified   taking as directed Co Q-10 10 mg capsule  Y Verified   taking as directed Centrum Silver Women 8 mg iron-400 mcg-300 mcg tablet  Y Verified   taking as directed B12 5,000 mcg-100 mcg sublingual lozenge  Y Verified   taking as directed Synthroid 50 mcg tablet take 1 tablet by oral route  every day N Verified   taking as directed lisinopril 20 mg tablet take 11/2 tablet by oral route  every day N Verified     Allergies  Ingredient Reaction (Severity) Medication Name Comment   NO KNOWN ALLERGIES        Reviewed, no changes. Family History:  Reviewed, no changes. Last detailed document date:05/29/2019. Social History:  Reviewed, no changes. Last detailed document date: 05/29/2019. Review of Systems  System Neg/Pos Details   Constitutional Negative Chills and Fever. ENMT Negative Ear infections and Sore throat. Eyes Negative Blurred vision, Double vision and Eye pain. Respiratory Negative Asthma, Chronic cough, Dyspnea and Wheezing. Cardio Negative Chest pain. GI Negative Constipation, Decreased appetite, Diarrhea, Nausea and Vomiting.  Positive Incomplete emptying, Nocturia, Urgency, Urinary frequency, Urinary incontinence.  Negative Hematuria. Endocrine Negative Cold intolerance, Heat intolerance, Increased thirst and Weight loss. Neuro Negative Headache and Tremors. Psych Negative Anxiety and Depression. Integumentary Negative Itching skin and Rash. MS Negative Back pain and Joint pain. Hema/Lymph Negative Easy bleeding.      Vital Signs     Height  Time ft in cm Last Measured Height Position   10:34 AM 4.0 10.50 148.59 05/29/2019 Standing     Weight/BSA/BMI  Time lb oz kg Context BMI kg/m2 BSA m2   10:34 .00  67.132  30.41      Measured By  Time Measured by   10:34 AM Celestina Montes       Physical Exam  Exam Findings Details Constitutional Normal Well developed. Neck Exam Normal Inspection - Normal.   Respiratory Normal Inspection - Normal.   Abdomen Normal No abdominal tenderness. Genitourinary Normal No Suprapubic tenderness. No CVA tenderness. Extremity Normal No Edema. Psychiatric Normal Orientation - Oriented to time, place, person & situation. Appropriate mood and affect. Patient Education  # Patient Education   1. Urge Incontinence in Women: Care Inst~     Medications (added, continued, or stopped today)  Start Date Medication Directions PRN Status PRN Reason Instruction Stop Date   10/18/2018 aspirin 81 mg tablet,delayed release take 1 tablet (81MG)  by oral route  every day N       B12 5,000 mcg-100 mcg sublingual lozenge  N       biotin 10 mg tablet  N      10/18/2018 Calcium 600 + D(3) 600 mg (1,500 mg)-400 unit tablet 1 tab po daily.  N       Centrum Silver Women 8 mg iron-400 mcg-300 mcg tablet  N       Co Q-10 10 mg capsule  N      05/09/2017 CPAP  INHALATION GAS ROTECH FAX# [phone] N       fexofenadine 180 mg tablet take 1 tablet by oral route  every day N      05/29/2019 lisinopril 20 mg tablet take 11/2 tablet by oral route  every day N      05/14/2019 simvastatin 20 mg tablet take 1 tablet by oral route  every day in the evening N      05/29/2019 Synthroid 50 mcg tablet take 1 tablet by oral route  every day N      10/18/2018 trazodone 100 mg tablet take 1 tablet by oral route  every day at bedtime N      Active Patient Care Team Members    Name Contact Agency Type Support Role Relationship Active Date Inactive Date Specialty   Giuliano Contreras   Patient provider PCP   Family Practice   Giuliano Contreras   primary care provider    Family Pract   [address]   encounter provider    Urology       Provider:       Jaimie Bentley MD

## 2019-08-06 NOTE — OP NOTES
DATE OF SERVICE: 8/6/2019     PREOPERATIVE DIAGNOSIS:  Urge incontinence, urinary frequency     POSTOPERATIVE DIAGNOSIS:  Urge incontinence, urinary frequency     PROCEDURE PERFORMED:  Cystoscopy, and Botox injection of the bladder, 100 units.       SURGEON: Maninder Unger MD      ASSISTANT:  None.     ANESTHESIA:  MAC.       ESTIMATED BLOOD LOSS:  None.     SPECIMENS REMOVED:  None      FINDINGS:  100 units of Botox were injected.     COMPLICATIONS:  None.     IMPLANTS:  None.     CLINICAL NOTE:  The patient is an[de-identified]year-old female with urge incontinence and frequency.  She presents for Botox.  Last injection was 10/2018     OPERATIVE REPORT:  Preoperatively, risks and benefits of surgery were described to the patient.  The risks include, but are not limited to bleeding, infection, injury, urinary retention. The patient understood the risks and signed the informed consent.      The patient was taken to the operating room and placed on the OR table in supine position.  She was administered a MAC anesthetic.  She was asministered anitbiotics and then placed into lithotomy position.  She was prepped and draped in a sterile manner.  A 21-Malian cystoscope sheath was then inserted transurethrally atraumatically under direct vision using a 30-degree lens.  Panendoscopy was done.  The bilateral ureteral orifices were normal in anatomic position.  There were no stones, no tumors, no areas of concerns within the bladder.  The bladder was then injected with Botox; 100 units were divided into 30 mL, 1 mL was injected at 30 different spots scattered throughout the bladder.  Care was taken not to inject at the trigone or near the ureteral orifices at all.  After all the injections were made into the detrusor muscle, the bladder was emptied and there was no bleeding.  The patient was then taken out of lithotomy position, revived from anesthesia, and taken to recovery in stable condition.  Beatris Christensen MD

## 2019-08-06 NOTE — ANESTHESIA PREPROCEDURE EVALUATION
Relevant Problems   No relevant active problems       Anesthetic History   No history of anesthetic complications            Review of Systems / Medical History  Patient summary reviewed, nursing notes reviewed and pertinent labs reviewed    Pulmonary        Sleep apnea: CPAP           Neuro/Psych   Within defined limits           Cardiovascular    Hypertension: well controlled              Exercise tolerance: >4 METS     GI/Hepatic/Renal  Within defined limits              Endo/Other      Hypothyroidism: well controlled  Arthritis     Other Findings              Physical Exam    Airway  Mallampati: II  TM Distance: 4 - 6 cm  Neck ROM: normal range of motion   Mouth opening: Normal     Cardiovascular    Rhythm: regular  Rate: normal         Dental    Dentition: Caps/crowns     Pulmonary  Breath sounds clear to auscultation               Abdominal  GI exam deferred       Other Findings            Anesthetic Plan    ASA: 2  Anesthesia type: MAC          Induction: Intravenous  Anesthetic plan and risks discussed with: Patient

## 2019-08-06 NOTE — ANESTHESIA POSTPROCEDURE EVALUATION
Procedure(s):  CYSTOSCOPY, BOTOX INJECTION TO BLADDER (100 UNITS). MAC    Anesthesia Post Evaluation        Comments: Post-Anesthesia Evaluation and Assessment    Cardiovascular Function/Vital Signs  /55 (BP 1 Location: Left arm, BP Patient Position: At rest;Sitting)   Pulse 62   Temp 36.6 °C (97.8 °F)   Resp 14   Ht 5' (1.524 m)   Wt 66.9 kg (147 lb 9 oz)   SpO2 96%   BMI 28.82 kg/m²     Patient is status post Procedure(s):  CYSTOSCOPY, BOTOX INJECTION TO BLADDER (100 UNITS). Nausea/Vomiting: Controlled. Postoperative hydration reviewed and adequate. Pain:  Pain Scale 1: Numeric (0 - 10) (08/06/19 1224)  Pain Intensity 1: 0 (08/06/19 1224)   Managed. Neurological Status:   Neuro (WDL): Within Defined Limits (08/06/19 1224)   At baseline. Mental Status and Level of Consciousness: Arousable. Pulmonary Status:   O2 Device: Room air (08/06/19 1211)   Adequate oxygenation and airway patent. Complications related to anesthesia: None    Post-anesthesia assessment completed. No concerns. Patient has met all discharge requirements. Signed By: Leticia Lobo MD    August 6, 2019                   No vitals data found for the desired time range.

## 2019-08-06 NOTE — PERIOP NOTES
Reviewed PTA medication list with patient/caregiver and patient/caregiver denies any additional medications. Patient admits to having a responsible adult care for them for at least 24 hours after surgery.     Permission granted by patient for a dual skin assessment. Dual skin assessment completed by Breanne Russell RN and Sindi JOHNSON.

## 2019-08-06 NOTE — INTERVAL H&P NOTE
H&P Update: 
Luis Nelson was seen and examined. History and physical has been reviewed. The patient has been examined.  There have been no significant clinical changes since the completion of the originally dated History and Physical.

## 2019-08-06 NOTE — DISCHARGE INSTRUCTIONS
Patient Education        Cystoscopy: What to Expect at [facility]    A cystoscopy is a procedure that lets a doctor look inside of the bladder and the urethra. The urethra is the tube that carries urine from the bladder to outside the body. The doctor uses a thin, lighted tool called a cystoscope. Your bladder is filled with fluid. This stretches the bladder so that your doctor can look closely at the inside of your bladder. After the cystoscopy, your urethra may be sore at first, and it may burn when you urinate for the first few days after the procedure. You may feel the need to urinate more often, and your urine may be pink. These symptoms should get better in 1 or 2 days. You will probably be able to go back to most of your usual activities in 1 or 2 days. This care sheet gives you a general idea about how long it will take for you to recover. But each person recovers at a different pace. Follow the steps below to get better as quickly as possible. How can you care for yourself at home? Activity    · Rest when you feel tired. Getting enough sleep will help you recover.     · Try to walk each day. Start by walking a little more than you did the day before. Bit by bit, increase the amount you walk. Walking boosts blood flow and helps prevent pneumonia and constipation.     · Avoid strenuous activities, such as bicycle riding, jogging, weight lifting, or aerobic exercise, until your doctor says it is okay.     · Ask your doctor when you can drive again.     · Most people are able to return to work within 1 or 2 days after the procedure.     · You may shower and take baths as usual.     · Ask your doctor when it is okay for you to have sex. Diet    · You can eat your normal diet. If your stomach is upset, try bland, low-fat foods like plain rice, broiled chicken, toast, and yogurt.     · Drink plenty of fluids (unless your doctor tells you not to).    Medicines    · Take pain medicines exactly as directed. ? If the doctor gave you a prescription medicine for pain, take it as prescribed. ? If you are not taking a prescription pain medicine, ask your doctor if you can take an over-the-counter medicine.     · If you think your pain medicine is making you sick to your stomach:  ? Take your medicine after meals (unless your doctor has told you not to). ? Ask your doctor for a different pain medicine.     · If your doctor prescribed antibiotics, take them as directed. Do not stop taking them just because you feel better. You need to take the full course of antibiotics. Follow-up care is a key part of your treatment and safety. Be sure to make and go to all appointments, and call your doctor if you are having problems. It's also a good idea to know your test results and keep a list of the medicines you take. When should you call for help? Call 911 anytime you think you may need emergency care. For example, call if:    · You passed out (lost consciousness).     · You have severe trouble breathing.     · You have sudden chest pain and shortness of breath, or you cough up blood.     · You have severe belly pain.    Call your doctor now or seek immediate medical care if:    · You are sick to your stomach or cannot keep fluids down.     · Your urine is still red or you see blood clots after you have urinated several times.     · You have trouble passing urine or stool, especially if you have pain or swelling in your lower belly.     · You have signs of a blood clot, such as:  ? Pain in your calf, back of the knee, thigh, or groin. ? Redness and swelling in your leg or groin.     · You develop a fever or severe chills.     · You have pain in your back just below your rib cage. This is called flank pain.    Watch closely for changes in your health, and be sure to contact your doctor if:    · You have pain or burning when you urinate.  A burning feeling is normal for a day or two after the test, but call if it does not get better.     · You have a frequent urge to urinate but can pass only small amounts of urine.     · Your urine is pink, red, or cloudy, or smells bad. It is normal for the urine to have a pinkish color for a few days after the test, but call if it does not get better. Where can you learn more? Go to http://cristobal-tennille.info/. Enter I950 in the search box to learn more about \"Cystoscopy: What to Expect at Home. \"  Current as of: December 19, 2018  Content Version: 12.1  © 0996-1920 [employer]. Care instructions adapted under license by [employer] (which disclaims liability or warranty for this information). If you have questions about a medical condition or this instruction, always ask your healthcare professional. [address] any warranty or liability for your use of this information. DISCHARGE SUMMARY from Nurse    PATIENT INSTRUCTIONS:    After general anesthesia or intravenous sedation, for 24 hours or while taking prescription Narcotics:  · Limit your activities  · Do not drive and operate hazardous machinery  · Do not make important personal or business decisions  · Do  not drink alcoholic beverages  · If you have not urinated within 8 hours after discharge, please contact your surgeon on call. Report the following to your surgeon:  · Excessive pain, swelling, redness or odor of or around the surgical area  · Temperature over 100.5  · Nausea and vomiting lasting longer than 4 hours or if unable to take medications  · Any signs of decreased circulation or nerve impairment to extremity: change in color, persistent  numbness, tingling, coldness or increase pain  · Any questions    What to do at Home:  Recommended activity: Activity as tolerated, Ambulate in house and No driving while on analgesics,     If you experience any of the following symptoms above, please follow up with Dr. Akira Nicole.     *  Please give a list of your current medications to your Primary Care Provider. *  Please update this list whenever your medications are discontinued, doses are      changed, or new medications (including over-the-counter products) are added. *  Please carry medication information at all times in case of emergency situations. These are general instructions for a healthy lifestyle:    No smoking/ No tobacco products/ Avoid exposure to second hand smoke  Surgeon General's Warning:  Quitting smoking now greatly reduces serious risk to your health. Obesity, smoking, and sedentary lifestyle greatly increases your risk for illness    A healthy diet, regular physical exercise & weight monitoring are important for maintaining a healthy lifestyle    You may be retaining fluid if you have a history of heart failure or if you experience any of the following symptoms:  Weight gain of 3 pounds or more overnight or 5 pounds in a week, increased swelling in our hands or feet or shortness of breath while lying flat in bed. Please call your doctor as soon as you notice any of these symptoms; do not wait until your next office visit. Patient armband removed and shredded    The discharge information has been reviewed with the patient and caregiver. The patient and caregiver verbalized understanding. Discharge medications reviewed with the patient and caregiver and appropriate educational materials and side effects teaching were provided.   ___________________________________________________________________________________________________________________________________

## 2021-08-16 ENCOUNTER — HOSPITAL ENCOUNTER (OUTPATIENT)
Dept: PREADMISSION TESTING | Age: 81
Discharge: HOME | End: 2021-08-16

## 2021-08-16 VITALS — WEIGHT: 150 LBS | BODY MASS INDEX: 29.45 KG/M2 | HEIGHT: 60 IN

## 2021-08-16 RX ORDER — GLUCOSAMINE/CHONDR SU A SOD 750-600 MG
10000 TABLET ORAL DAILY
COMMUNITY

## 2021-08-16 RX ORDER — SODIUM CHLORIDE, SODIUM LACTATE, POTASSIUM CHLORIDE, CALCIUM CHLORIDE 600; 310; 30; 20 MG/100ML; MG/100ML; MG/100ML; MG/100ML
125 INJECTION, SOLUTION INTRAVENOUS CONTINUOUS
Status: CANCELLED | OUTPATIENT
Start: 2021-08-16

## 2021-08-16 RX ORDER — MELATONIN
2000 DAILY
COMMUNITY

## 2021-08-16 RX ORDER — LEVOTHYROXINE SODIUM 25 UG/1
25 TABLET ORAL
COMMUNITY

## 2021-08-16 RX ORDER — CALCIUM/MAGNESIUM/ZINC
1 TABLET ORAL DAILY
COMMUNITY

## 2021-08-16 RX ORDER — CEFAZOLIN SODIUM/WATER 2 G/20 ML
2 SYRINGE (ML) INTRAVENOUS ONCE
Status: CANCELLED | OUTPATIENT
Start: 2021-08-16 | End: 2021-08-16

## 2021-08-16 RX ORDER — VALACYCLOVIR HYDROCHLORIDE 1 G/1
1000 TABLET, FILM COATED ORAL
COMMUNITY

## 2021-08-16 NOTE — PERIOP NOTES
Operative consent filled out according to MD order on surgical posting, verbatim. During PAT interview, patient advised to self quarantine 3 days prior to DOS. Patient instructed to come in for Covid19 testing from 9-11 am on 08/30/2021 prior to surgery. Patient instructed to not bring any valuables on DOS including Pocketbook, wallet, jewelry, cell phone, lap top computer or tablet. Patient instructed not to wear make up, powders, deodorant, creams, or lotions on DOS. Patient confirmed receipt of CHG skin preparation and instructions. Patient is aware of one visitor policy in surgical pavilion.

## 2021-08-20 ENCOUNTER — HOSPITAL ENCOUNTER (OUTPATIENT)
Dept: PREADMISSION TESTING | Age: 81
Discharge: HOME | End: 2021-08-20
Payer: MEDICARE

## 2021-08-20 LAB
ANION GAP SERPL CALC-SCNC: 5 MMOL/L (ref 3–18)
ATRIAL RATE: 53 BPM
BUN SERPL-MCNC: 22 MG/DL (ref 7–18)
BUN/CREAT SERPL: 23 (ref 12–20)
CALCIUM SERPL-MCNC: 9.2 MG/DL (ref 8.5–10.1)
CALCULATED P AXIS, ECG09: 77 DEGREES
CALCULATED R AXIS, ECG10: 34 DEGREES
CALCULATED T AXIS, ECG11: 69 DEGREES
CHLORIDE SERPL-SCNC: 108 MMOL/L (ref 100–111)
CO2 SERPL-SCNC: 28 MMOL/L (ref 21–32)
CREAT SERPL-MCNC: 0.95 MG/DL (ref 0.6–1.3)
DIAGNOSIS, 93000: NORMAL
ERYTHROCYTE [DISTWIDTH] IN BLOOD BY AUTOMATED COUNT: 13.3 % (ref 11.6–14.5)
GLUCOSE SERPL-MCNC: 92 MG/DL (ref 74–99)
HCT VFR BLD AUTO: 40.3 % (ref 35–45)
HGB BLD-MCNC: 12.8 G/DL (ref 12–16)
MCH RBC QN AUTO: 28.8 PG (ref 24–34)
MCHC RBC AUTO-ENTMCNC: 31.8 G/DL (ref 31–37)
MCV RBC AUTO: 90.8 FL (ref 74–97)
P-R INTERVAL, ECG05: 158 MS
PLATELET # BLD AUTO: 228 K/UL (ref 135–420)
PMV BLD AUTO: 10.9 FL (ref 9.2–11.8)
POTASSIUM SERPL-SCNC: 4.3 MMOL/L (ref 3.5–5.5)
Q-T INTERVAL, ECG07: 450 MS
QRS DURATION, ECG06: 90 MS
QTC CALCULATION (BEZET), ECG08: 422 MS
RBC # BLD AUTO: 4.44 M/UL (ref 4.2–5.3)
SODIUM SERPL-SCNC: 141 MMOL/L (ref 136–145)
VENTRICULAR RATE, ECG03: 53 BPM
WBC # BLD AUTO: 6 K/UL (ref 4.6–13.2)

## 2021-08-20 PROCEDURE — 93005 ELECTROCARDIOGRAM TRACING: CPT

## 2021-08-20 PROCEDURE — 85027 COMPLETE CBC AUTOMATED: CPT

## 2021-08-20 PROCEDURE — 36415 COLL VENOUS BLD VENIPUNCTURE: CPT

## 2021-08-20 PROCEDURE — 80048 BASIC METABOLIC PNL TOTAL CA: CPT

## 2021-08-30 ENCOUNTER — HOSPITAL ENCOUNTER (OUTPATIENT)
Dept: PREADMISSION TESTING | Age: 81
Discharge: HOME | End: 2021-08-30
Payer: MEDICARE

## 2021-08-30 PROCEDURE — U0003 INFECTIOUS AGENT DETECTION BY NUCLEIC ACID (DNA OR RNA); SEVERE ACUTE RESPIRATORY SYNDROME CORONAVIRUS 2 (SARS-COV-2) (CORONAVIRUS DISEASE [COVID-19]), AMPLIFIED PROBE TECHNIQUE, MAKING USE OF HIGH THROUGHPUT TECHNOLOGIES AS DESCRIBED BY CMS-2020-01-R: HCPCS

## 2021-08-31 LAB — SARS-COV-2, NAA: NOT DETECTED

## 2021-09-01 ENCOUNTER — ANESTHESIA EVENT (OUTPATIENT)
Dept: SURGERY | Age: 81
End: 2021-09-01
Payer: MEDICARE

## 2021-09-02 ENCOUNTER — APPOINTMENT (OUTPATIENT)
Dept: GENERAL RADIOLOGY | Age: 81
End: 2021-09-02
Attending: UROLOGY
Payer: MEDICARE

## 2021-09-02 ENCOUNTER — ANESTHESIA (OUTPATIENT)
Dept: SURGERY | Age: 81
End: 2021-09-02
Payer: MEDICARE

## 2021-09-02 ENCOUNTER — HOSPITAL ENCOUNTER (OUTPATIENT)
Age: 81
Setting detail: OUTPATIENT SURGERY
Discharge: HOME | End: 2021-09-02
Attending: UROLOGY | Admitting: UROLOGY
Payer: MEDICARE

## 2021-09-02 VITALS
TEMPERATURE: 97.3 F | SYSTOLIC BLOOD PRESSURE: 142 MMHG | HEART RATE: 67 BPM | HEIGHT: 60 IN | RESPIRATION RATE: 18 BRPM | WEIGHT: 152.8 LBS | BODY MASS INDEX: 30 KG/M2 | DIASTOLIC BLOOD PRESSURE: 66 MMHG | OXYGEN SATURATION: 96 %

## 2021-09-02 PROCEDURE — C1897 LEAD, NEUROSTIM TEST KIT: HCPCS | Performed by: UROLOGY

## 2021-09-02 PROCEDURE — 77030020268 HC MISC GENERAL SUPPLY: Performed by: UROLOGY

## 2021-09-02 PROCEDURE — 77030020782 HC GWN BAIR PAWS FLX 3M -B: Performed by: UROLOGY

## 2021-09-02 PROCEDURE — 77030040361 HC SLV COMPR DVT MDII -B: Performed by: UROLOGY

## 2021-09-02 PROCEDURE — 74011000258 HC RX REV CODE- 258: Performed by: NURSE ANESTHETIST, CERTIFIED REGISTERED

## 2021-09-02 PROCEDURE — 77030018842 HC SOL IRR SOD CL 9% BAXT -A: Performed by: UROLOGY

## 2021-09-02 PROCEDURE — 74011250636 HC RX REV CODE- 250/636: Performed by: NURSE ANESTHETIST, CERTIFIED REGISTERED

## 2021-09-02 PROCEDURE — 76210000063 HC OR PH I REC FIRST 0.5 HR: Performed by: UROLOGY

## 2021-09-02 PROCEDURE — 77030034475 HC MISC IMPL SPN: Performed by: UROLOGY

## 2021-09-02 PROCEDURE — 74420 UROGRAPHY RTRGR +-KUB: CPT

## 2021-09-02 PROCEDURE — 77030031139 HC SUT VCRL2 J&J -A: Performed by: UROLOGY

## 2021-09-02 PROCEDURE — 74011000272 HC RX REV CODE- 272: Performed by: UROLOGY

## 2021-09-02 PROCEDURE — 76060000033 HC ANESTHESIA 1 TO 1.5 HR: Performed by: UROLOGY

## 2021-09-02 PROCEDURE — 76010000149 HC OR TIME 1 TO 1.5 HR: Performed by: UROLOGY

## 2021-09-02 PROCEDURE — 74011250636 HC RX REV CODE- 250/636: Performed by: UROLOGY

## 2021-09-02 PROCEDURE — C1820 GENERATOR NEURO RECHG BAT SY: HCPCS | Performed by: UROLOGY

## 2021-09-02 PROCEDURE — 2709999900 HC NON-CHARGEABLE SUPPLY: Performed by: UROLOGY

## 2021-09-02 PROCEDURE — 74011000250 HC RX REV CODE- 250: Performed by: UROLOGY

## 2021-09-02 PROCEDURE — 77030002888 HC SUT CHRMC J&J -A: Performed by: UROLOGY

## 2021-09-02 PROCEDURE — 76210000026 HC REC RM PH II 1 TO 1.5 HR: Performed by: UROLOGY

## 2021-09-02 RX ORDER — CEFAZOLIN SODIUM/WATER 2 G/20 ML
2 SYRINGE (ML) INTRAVENOUS ONCE
Status: DISCONTINUED | OUTPATIENT
Start: 2021-09-02 | End: 2021-09-02 | Stop reason: HOSPADM

## 2021-09-02 RX ORDER — SODIUM CHLORIDE 0.9 % (FLUSH) 0.9 %
5-40 SYRINGE (ML) INJECTION EVERY 8 HOURS
Status: DISCONTINUED | OUTPATIENT
Start: 2021-09-02 | End: 2021-09-02 | Stop reason: HOSPADM

## 2021-09-02 RX ORDER — SODIUM CHLORIDE 0.9 % (FLUSH) 0.9 %
5-40 SYRINGE (ML) INJECTION AS NEEDED
Status: DISCONTINUED | OUTPATIENT
Start: 2021-09-02 | End: 2021-09-02 | Stop reason: HOSPADM

## 2021-09-02 RX ORDER — SODIUM CHLORIDE, SODIUM LACTATE, POTASSIUM CHLORIDE, CALCIUM CHLORIDE 600; 310; 30; 20 MG/100ML; MG/100ML; MG/100ML; MG/100ML
125 INJECTION, SOLUTION INTRAVENOUS CONTINUOUS
Status: DISCONTINUED | OUTPATIENT
Start: 2021-09-02 | End: 2021-09-02 | Stop reason: HOSPADM

## 2021-09-02 RX ORDER — SODIUM BICARBONATE 1 MEQ/ML
SYRINGE (ML) INTRAVENOUS AS NEEDED
Status: DISCONTINUED | OUTPATIENT
Start: 2021-09-02 | End: 2021-09-02 | Stop reason: HOSPADM

## 2021-09-02 RX ORDER — HYDROCODONE BITARTRATE AND ACETAMINOPHEN 5; 325 MG/1; MG/1
1 TABLET ORAL AS NEEDED
Status: DISCONTINUED | OUTPATIENT
Start: 2021-09-02 | End: 2021-09-02 | Stop reason: HOSPADM

## 2021-09-02 RX ORDER — DEXTROSE 50 % IN WATER (D50W) INTRAVENOUS SYRINGE
25-50 AS NEEDED
Status: DISCONTINUED | OUTPATIENT
Start: 2021-09-02 | End: 2021-09-02 | Stop reason: HOSPADM

## 2021-09-02 RX ORDER — LIDOCAINE HYDROCHLORIDE 10 MG/ML
INJECTION INFILTRATION; PERINEURAL AS NEEDED
Status: DISCONTINUED | OUTPATIENT
Start: 2021-09-02 | End: 2021-09-02 | Stop reason: HOSPADM

## 2021-09-02 RX ORDER — SODIUM CHLORIDE, SODIUM LACTATE, POTASSIUM CHLORIDE, CALCIUM CHLORIDE 600; 310; 30; 20 MG/100ML; MG/100ML; MG/100ML; MG/100ML
INJECTION, SOLUTION INTRAVENOUS
Status: DISCONTINUED | OUTPATIENT
Start: 2021-09-02 | End: 2021-09-02 | Stop reason: HOSPADM

## 2021-09-02 RX ORDER — PROPOFOL 10 MG/ML
INJECTION, EMULSION INTRAVENOUS
Status: DISCONTINUED | OUTPATIENT
Start: 2021-09-02 | End: 2021-09-02 | Stop reason: HOSPADM

## 2021-09-02 RX ORDER — MAGNESIUM SULFATE 100 %
4 CRYSTALS MISCELLANEOUS AS NEEDED
Status: DISCONTINUED | OUTPATIENT
Start: 2021-09-02 | End: 2021-09-02 | Stop reason: HOSPADM

## 2021-09-02 RX ORDER — FENTANYL CITRATE 50 UG/ML
25 INJECTION, SOLUTION INTRAMUSCULAR; INTRAVENOUS AS NEEDED
Status: DISCONTINUED | OUTPATIENT
Start: 2021-09-02 | End: 2021-09-02 | Stop reason: HOSPADM

## 2021-09-02 RX ADMIN — SODIUM CHLORIDE, SODIUM LACTATE, POTASSIUM CHLORIDE, AND CALCIUM CHLORIDE: 600; 310; 30; 20 INJECTION, SOLUTION INTRAVENOUS at 07:27

## 2021-09-02 RX ADMIN — PROPOFOL 50 MCG/KG/MIN: 10 INJECTION, EMULSION INTRAVENOUS at 07:32

## 2021-09-02 RX ADMIN — CEFAZOLIN 2 G: 1 INJECTION, POWDER, FOR SOLUTION INTRAMUSCULAR; INTRAVENOUS at 07:44

## 2021-09-02 RX ADMIN — SODIUM CHLORIDE, SODIUM LACTATE, POTASSIUM CHLORIDE, AND CALCIUM CHLORIDE 125 ML/HR: 600; 310; 30; 20 INJECTION, SOLUTION INTRAVENOUS at 06:25

## 2021-09-02 NOTE — INTERVAL H&P NOTE
Update History & Physical    The Patient's History and Physical of August 31, 2021 was reviewed with the patient and I examined the patient. There was no change. The surgical site was confirmed by the patient and me. Plan:  The risk, benefits, expected outcome, and alternative to the recommended procedure have been discussed with the patient. Patient understands and wants to proceed with the procedure.     Electronically signed by Katie Santos MD on 9/2/2021 at 6:42 AM

## 2021-09-02 NOTE — H&P
Urology [address] [address]  Tel: [phone]  Fax: [phone]    Patient : Crystal Garland   YOB: 1940   Birth Sex: Female      Current Gender: Female      Date:  08/31/2021 6:36 AM    Visit Type:   H&P   Assessment/Plan  # Detail Type Description    1. Assessment Urge incontinence (N39.41). Patient Plan Patient with long history of urinary difficulties. She has been treated with InterStim which is not improved her symptoms as well as Botox. At this time she will proceed with removal of  InterStim with place with Axonics implant. Risks including pain infection bleeding lead fracture reviewed with patient she understands will proceed         2. Assessment Frequency of micturition (R35.0). 3. Assessment Nocturia (R35.1). Additional Visit Information      This 78year old female presents for Overactive Bladder. History of Present Illness  1. Overactive Bladder   Severity level is moderate-severe. It occurs constantly. The problem is improving. Associated symptoms include incomplete emptying, nocturia (1 times per night), urgency, urinary incontinence (mostly urge, small RONDA) and urinary frequency (every 2 hours). Pertinent negatives include chills, constipation, fever and hematuria. Additional information: She has failed ditropan detrol, and Vesicare. She wears 3-4 pads per day. Myrbetriq improved things to 2-3 pads per day initially. She had stage 1 interstim placed and was down to 1 pad and Q3 hr freq. On stage 2 interstim freq is down to Q3hrs, nocturia x1. She did well until 3 months ago. Now she is at 6-7 pads/day. Unchanged since starting Myrbetriq in addition to the interstim. Increasing the settings and changing programs have been unhelpful. .            7/2017: With the interstim off, she is now on  nocturia  2 and 7-8 pads per day. No UTI''''''''''''''''s.   No pain or dysuria, but s eis very frustrated     8/2017 -- She is 2 weeks s/p inerstim revision. Her lead was no longer making good contact and the lead and generator were replaced. She feels fine w/o fever or problem. However, her symptoms are unchanged at program 3 with amplitude of 1.0.    10/2/17 -- She has cycled through multiple programs and she hasn''''''''''''''''t had any success. She is now on program 3 at 4.5 amplitude. She wears 5 pads per day. 10/19/17 -- she is now program 1 at amplitude 5.5 and her incontiinence is down to 3 pads per day. No pain or radiation or dysuria. 10/2018 -- Her son recently passed. She is miserable with her issues. She is better at night, but she wears 6 pads per day and leaks by day. She really wishes things were better. no UTI.  no pain or radiation. Aleways urge, no RONDA.    11/21/18 - 1 month s/p 100 units botox and she feels great. It took 10 days to begin to take effect. No urgency. Nocturia x 1. No incontinence. No trouble voiding. No SP pain or rpessure or radiation. 6/7/19 - She is now getting worse. No UTI. No dysuriqa. She is back to wearing pads 3 per day. A little more urgency. no hematuria or dysuria. More noctruia lately. No SP pain or radiation or agravating factors. 9/9/19 -- She is 1 month s/p Botox 100 units. she feels great. no dysruia. No straining. no troubles starting a stream.  no pain or radiation. PVR=12ml  07/30/2021   Patient with a history of urinary difficulties. She has tried Botox in 2019 was not please she also had InterStim in 2015 is nonfunctioning. We discussed treatment options we reviewed video on Axonics sacral nerve modulation. She is interested in pursuing removal of her InterStim device and replacement with Axonics device. She is still having frequency urgency nocturia urge incontinence she is wearing pads and this affects her everyday living and socializing so she is anxious to have this procedure.   8/31/21  Pt is scheduled for removal of Interstim and placement of Axonics implant. 9/2/21. She has had no changes in her urinary symptoms        Problem List  Problem Description Onset Date Chronic Clinical Status Notes   Osteopenia  Y     Aortic sclerosis  Y  no stenosis noted, ECHO 5/2021   Sleep apnea  Y     Insomnia 08/10/2011 Y     Urgent desire to urinate 08/10/2011 Y     Benign essential hypertension 04/08/2011 Y     Hyperlipidemia 04/08/2011 Y     Hypothyroidism 04/08/2011 N     Low compliance bladder 04/08/2011 Y     Disorder of bone and articular cartilage 04/08/2011 Y     Localized, primary osteoarthritis of the shoulder region 06/23/2011 Y       Medications (active prior to today)  Medication Instructions Start Date Stop Date Refilled Elsewhere   CPAP  INHALATION GAS Farzad Camara FAX# [phone] 05/09/2017   N   Calcium 600 + D(3) 600 mg (1,500 mg)-400 unit tablet 1 tab po daily. 10/18/2018  10/18/2018 N   biotin 10 mg tablet  //   Y   Co Q-10 10 mg capsule  //   Y   Centrum Silver Women 8 mg iron-400 mcg-300 mcg tablet  //   Y   B12 5,000 mcg-100 mcg sublingual lozenge  //   Y   fexofenadine 180 mg tablet take 1 tablet by oral route  every day 05/15/2020  05/15/2020 N   lisinopril 20 mg tablet take 1 tablet by oral route  every day 05/15/2020  05/15/2020 N   trazodone 100 mg tablet take 1 tablet by oral route  every day at bedtime 06/26/2020 06/26/2020 N   Lidoderm 5 % topical patch apply 1 patch by transdermal route  every day (May wear up to 12hours.) 08/12/2020   N   simvastatin 20 mg tablet take 1 tablet by oral route  every day in the evening 08/19/2020 08/19/2020 N   Synthroid 25 mcg tablet take 1 tablet by oral route  every day 08/05/2021 08/05/2021 N         Allergies  Ingredient Reaction (Severity) Medication Name Comment   NO KNOWN ALLERGIES          Review of Systems  System Neg/Pos Details   Constitutional Negative Chills and Fever. ENMT Negative Ear infections and Sore throat.    Eyes Negative Blurred vision, Double vision and Eye pain. Respiratory Negative Asthma, Chronic cough, Dyspnea and Wheezing. Cardio Negative Chest pain. GI Negative Constipation, Decreased appetite, Diarrhea, Nausea and Vomiting.  Positive Incomplete emptying, Nocturia, Urgency, Urinary frequency, Urinary incontinence.  Negative Hematuria. Endocrine Negative Cold intolerance, Heat intolerance, Increased thirst and Weight loss. Neuro Negative Headache and Tremors. Psych Negative Anxiety and Depression. Integumentary Negative Itching skin and Rash. MS Negative Back pain and Joint pain. Hema/Lymph Negative Easy bleeding. Physical Exam  Exam Findings Details   Constitutional Normal Well developed. Neck Exam Normal Inspection - Normal.   Respiratory Normal Inspection - Normal.   Abdomen Normal No abdominal tenderness. Genitourinary * Pelvic deferred. Genitourinary Normal No Suprapubic tenderness. No CVA tenderness. No Flank mass   Psychiatric Normal Orientation - Oriented to time, place, person & situation. Appropriate mood and affect. Immunizations Entered by History  Date Immunization   10/28/2020 12:00:00 AM influenza, high-dose seasonal, quadrivalent, . 7mL dose, preservative free   9/16/2019 12:00:00 AM influenza, high dose seasonal, preservative-free   1/1/2008 12:00:00 AM Zoster   1/1/2010 12:00:00 AM pneumococcal polysaccharide vaccine, 23 valent   10/23/2015 12:00:00 AM Influenza, seasonal, injectable, preservative free   10/1/2014 12:00:00 AM Flu (3 yrs or older)   9/12/2013 12:00:00 AM Influenza virus vaccine, intranasal   Medications (added, continued, or stopped today)  Start Date Medication Directions PRN Status PRN Reason Instruction Stop Date    B12 5,000 mcg-100 mcg sublingual lozenge  N       biotin 10 mg tablet  N      10/18/2018 Calcium 600 + D(3) 600 mg (1,500 mg)-400 unit tablet 1 tab po daily.  N       Centrum Silver Women 8 mg iron-400 mcg-300 mcg tablet  N       Co Q-10 10 mg capsule  N      05/09/2017 CPAP  INHALATION GAS [facility] FAX# [phone] N      05/15/2020 fexofenadine 180 mg tablet take 1 tablet by oral route  every day N      08/12/2020 Lidoderm 5 % topical patch apply 1 patch by transdermal route  every day (May wear up to 12hours.) N      05/15/2020 lisinopril 20 mg tablet take 1 tablet by oral route  every day N      08/19/2020 simvastatin 20 mg tablet take 1 tablet by oral route  every day in the evening N      08/05/2021 Synthroid 25 mcg tablet take 1 tablet by oral route  every day N      06/26/2020 trazodone 100 mg tablet take 1 tablet by oral route  every day at bedtime N          Active Patient Care Team Members  Name Contact Agency Type Support Role Relationship Active Date Inactive Date Specialty   Cozetta Organ   Patient provider PCP   Family Practice   Cozetta Organ   primary care provider    Family Pract   [facility] [address] [facility]   encounter provider    Urology       Provider:    Joan Gonzalez MD 08/31/2021 6:40 AM     Document generated by:  Zunilda Vides 08/31/2021 06:40 AM      ----------------------------------------------------------------------------------------------------------------------------------------------------------------------      Electronically signed by Joan Gonzalez MD on 08/31/2021 06:40 AM

## 2021-09-02 NOTE — ANESTHESIA POSTPROCEDURE EVALUATION
Procedure(s):  REMOVAL OF MEDTRONIC IMPLANT, REPLACE WITH Sencha I & II IMPLANT WITH C-ARM. MAC, general - backup    Anesthesia Post Evaluation      Multimodal analgesia: multimodal analgesia not used between 6 hours prior to anesthesia start to PACU discharge  Patient location during evaluation: PACU  Patient participation: complete - patient participated  Level of consciousness: awake and alert  Pain score: 3  Pain management: adequate  Airway patency: patent  Anesthetic complications: no  Cardiovascular status: hemodynamically unstable and blood pressure returned to baseline  Respiratory status: room air, unassisted and spontaneous ventilation  Hydration status: euvolemic  Post anesthesia nausea and vomiting:  none  Final Post Anesthesia Temperature Assessment:  Normothermia (36.0-37.5 degrees C)      INITIAL Post-op Vital signs:   Vitals Value Taken Time   /72 09/02/21 0901   Temp 37 °C (98.6 °F) 09/02/21 0854   Pulse 86 09/02/21 0903   Resp 20 09/02/21 0903   SpO2 99 % 09/02/21 0903   Vitals shown include unvalidated device data.

## 2021-09-02 NOTE — OP NOTES
Postoperative Note    Patient: Jessy Bauer  YOB: 1940  MRN: [id number]    Date of Procedure: 9/2/2021     Pre-Op Diagnosis: URGE INCONTINENCE    Post-Op Diagnosis: Same as preoperative diagnosis. Procedure(s):  REMOVAL OF MEDTRONIC IMPLANT, REPLACE WITH AXONICS I & II IMPLANT WITH C-ARM    Surgeon(s):  Mirian Oviedo MD    Surgical Assistant: None    Anesthesia: MAC     Estimated Blood Loss (mL): Minimal    Complications: None    Specimens: * No specimens in log *     Implants:   Implant Name Type Inv. Item Serial No.  Lot No. LRB No. Used Action   AXONICS TINED LEAD KIT   [id number] [employer]  Left 1 Implanted   NEUROSTIMULATOR   [id number] [employer]  Left 1 Implanted       Drains: * No LDAs found *    Findings: Non functioning Interstim and Urge Incontinence     Informed consent was obtained, the patient taken to the operating room and after administration of general anesthesia on OR stretcher, she was flipped in the prone position. All pressure pints were secured. The patient was prepped and draped in usual surgical fashion. At this point the skin over the old device on the left side was anesthetized with 0.25% Marcaine:1% Lidocaine without epinephrine. Using a 15 blade I made an incision over the old device then using bovie cautery I dissected the device free. I used the supplied screw  and removed the old device from the permanent lead. I then dissected the lead medially to the insertion site near the midline. I then used a 15 blade to make a small incision over the insertion site and with the aid of fluoroscopy, I grasped the lead with a right angle clamp and marcelo it out of the incision. I was able to remove the lead completely by using gentle traction in a cephalad manner. The wound was irrigated and a saline soaked gauze was placed in the wound.  The wound was closed in 2 layers; a running 3-0 Vicryl for Adams layer, and a running subcuticular 4-0 Vicryl for the skin. All layers were irrigated in between. Excellent hemostasis was obtained. The insertion site in the sacrum was also closed with a single 4-0 chromic suture. The wounds were then washed and dried. Dermabond sterile dressings were applied. My attention now turned to placing the new sacral nerve stimulator  The C-arm was moved into the lateral position to image the area of the sacral promontory to the coccyx. We then obtained an AP view to identify the superior medial aspect of S3. This was then marked on the skin level on the LEFT side, since the old ead was on the RIGHT side. Next, the skin was anesthetized with 0.25% Marcaine:1% Lidocaine without epinephrine. The anesthetic was placed at the skin insertion point and at the periosteal level for the lead insertion, as well as preparing the pocket for the neurostimulator. A foramen needle was then inserted at the marked position parallel to the foraminal line. It entered the S3 foramen without difficulty. Depth of the needle was confirmed with lateral fluoroscopy. Proper needle position was confirmed by direct observation of lifting of the perineum or \"bellowing\" and the observance of plantar flexion of the great toe using the test stimulator box. The needle stylet was removed, and a directional guide was placed, confirming its placement and depth on fluoroscopy. The foramen needle was then removed. An incision was made laterally from the directional guide through the skin, and then a dilator and introducer sheath was placed over the directional guide and directed into the foramen, until the opaque marker of the dilator was seen at the midline the of the sacrum. The dilator obturator was unlocked and removed along with the directional guide. The tined lead with a curved stylet, was then placed through the introducer sheath so that lead 2 and 3 straddled the anterior margin of the bone.  Position was confirmed by fluoroscopy. The lead was then further introduced, until three electrodes were visible below the sacrum, and one electrode within the sacrum. Each electrode was tested for visualization of eren and plantar flexion of the great toe. We had good responses on all four leads both plantar flexion and eren response. After satisfactory positioning was confirmed both AP and lateral views, the introducer sheath was retracted under continuous fluoroscopy, deploying the tined leads into the presacral tissue. Leads were then retested to confirm appropriate motor response. A tunneling tool with an overlying plastic sheath was inserted from the lead insertion site subcutaneously to the new INS pocketsite which had been previously marked to be located in the hollow of the ileum. After administration of local anesthetic, a 2.5cm incision was made lateral to the site of INS placement site, to a depth no greater than 2cm deep to the skin. A pocket was created to accommodate the INS using combination of sharp and blunt dissection, maintaining the depth of 2 cm. The tunneling tool was passed from the lead wire insertion site to the lateral pocket. The sharp tip of the tunneling tool was removed and the lead was fed through the sheath, exiting at the pocket site. The wound was copiously irrigated with antibiotic saline solution. The [employer] neurostimulator was then connected to the tined lead after it was dried off. The hex nut wrench was used to tighten down the ratcheted hex nut. The neurostimulator was placed back into the pocket with the wire hidden underneath it. The neurostimulator was interrogated, appropriate function was noted. The wound was then closed in two layers; a 3-0 Vicryl running suture for the Adam layer, and a 4-0 Vicryl for the subcuticular layer. The wound was then washed off and dried. Dermabond was placed over the incisions, and the procedure ended.  The patient was placed in the supine position on the stretcher, awakened from IV sedation, and transferred to the recovery room awake, alert, and in stable condition.

## 2021-09-02 NOTE — ANESTHESIA PREPROCEDURE EVALUATION
Relevant Problems   No relevant active problems       Anesthetic History   No history of anesthetic complications            Review of Systems / Medical History  Patient summary reviewed, nursing notes reviewed and pertinent labs reviewed    Pulmonary        Sleep apnea: CPAP           Neuro/Psych   Within defined limits           Cardiovascular    Hypertension: well controlled              Exercise tolerance: >4 METS  Comments: Denies chest pain with exertion, last dose lisinopril 9/1/21   GI/Hepatic/Renal  Within defined limits              Endo/Other      Hypothyroidism: well controlled  Arthritis     Other Findings              Physical Exam    Airway  Mallampati: II  TM Distance: 4 - 6 cm  Neck ROM: normal range of motion   Mouth opening: Normal     Cardiovascular    Rhythm: regular  Rate: normal         Dental    Dentition: Caps/crowns     Pulmonary  Breath sounds clear to auscultation               Abdominal  GI exam deferred       Other Findings            Anesthetic Plan    ASA: 2  Anesthesia type: MAC and general - backup    Monitoring Plan: Continuous noninvasive hemodynamic monitoring      Induction: Intravenous  Anesthetic plan and risks discussed with: Patient

## 2021-09-02 NOTE — DISCHARGE INSTRUCTIONS
Francisco Ann. Lizz Welch M.D. [facility]  [address], [address] [place], Adriana Martin [facility]  Office: [phone]  Fax:    [phone]: Procedure(s):  REMOVAL OF MEDTRONIC IMPLANT, [address] [place] [address]     [facility] IMMEDIATELY if any of the following occur:     You are unable to urinate. Urgency to urinate is not uncommon.  You find yourself urinating small frequent amounts associated with severe lower abdominal discomfort.  Bright red blood with clots in the urine. Some reddish urine is not uncommon and should be treated with increasing the amount of fluids you drink.  Temperature above 101.5° and / or chills.  You are nauseous and / or vomiting and you cannot hold down any fluids.  Your pain is not controlled with the pain medication prescribed. Special Considerations:      Do not drive for at least 24 hours after the procedure and until you are no longer taking narcotic pain medication and you are able to move and react without hesitation. MEDICATIONS:  Pain   []  Norco®   []  Percocet® []  Dilaudid®    [x]  Tramadol   Antibiotics   []  Cipro   [x]  Keflex    [] Levaquin   []  Bactrim DS®       Urination   []  Vesicare®   []  Flomax     Burning   []  Pyridium®   []  UribelTM     Nausea   []  Zofran®   []  Phenergan®     Miscellaneous   []           [] Prescriptions Written on Chart    [x] Prescriptions sent Electronically           Our office will call you tomorrow to schedule your first follow-up appointment. Please contact [facility] at [phone] or go to the nearest Emergency Department / Urgent Care facility for any other medical questions or concerns.         DISCHARGE SUMMARY from Nurse    PATIENT INSTRUCTIONS:    After general anesthesia or intravenous sedation, for 24 hours or while taking prescription Narcotics:  · Limit your activities  · Do not drive and operate hazardous machinery  · Do not make important personal or business decisions  · Do  not drink alcoholic beverages  · If you have not urinated within 8 hours after discharge, please contact your surgeon on call. Report the following to your surgeon:  · Excessive pain, swelling, redness or odor of or around the surgical area  · Temperature over 100.5  · Nausea and vomiting lasting longer than 4 hours or if unable to take medications  · Any signs of decreased circulation or nerve impairment to extremity: change in color, persistent  numbness, tingling, coldness or increase pain  · Any questions    What to do at Home:  Recommended activity: Ambulate in house and No driving while on analgesics,     If you experience any of the following symptoms above, please follow up with Dr. Laura Dolan. *  Please give a list of your current medications to your Primary Care Provider. *  Please update this list whenever your medications are discontinued, doses are      changed, or new medications (including over-the-counter products) are added. *  Please carry medication information at all times in case of emergency situations. These are general instructions for a healthy lifestyle:    No smoking/ No tobacco products/ Avoid exposure to second hand smoke  Surgeon General's Warning:  Quitting smoking now greatly reduces serious risk to your health. Obesity, smoking, and sedentary lifestyle greatly increases your risk for illness    A healthy diet, regular physical exercise & weight monitoring are important for maintaining a healthy lifestyle    You may be retaining fluid if you have a history of heart failure or if you experience any of the following symptoms:  Weight gain of 3 pounds or more overnight or 5 pounds in a week, increased swelling in our hands or feet or shortness of breath while lying flat in bed. Please call your doctor as soon as you notice any of these symptoms; do not wait until your next office visit.     Patient armband removed and shredded    The discharge information has been reviewed with the patient and caregiver. The patient and caregiver verbalized understanding. Discharge medications reviewed with the patient and caregiver and appropriate educational materials and side effects teaching were provided. Learning About COVID-19 and Social Distancing  What is it? Social distancing means putting space between yourself and other people. The recommended distance is 6 feet, or about 2 meters. This also means staying away from any place where people may gather, such as rosa or other public gathering places. Why is it important? Social distancing is the best way to reduce the spread of COVID-19. This virus seems to spread from person to person through droplets from coughing and sneezing. So if you keep your distance from others, you're less likely to get it or spread it. And social distancing is important for everyone, not just those who are at high risk of infection, like older people. You might have the virus but not have symptoms. You could then give the infection to someone you come into contact with. How is it done? Putting 6 feet, or about 2 meters, between you and other people is the recommended distance. Also stay away from any place where people may gather, such as rosa or other public gathering places. So if possible:  · Work from home, and keep your kids at home. · Don't travel if you don't have to. And avoid public transportation, ride-shares, and taxis unless you have no choice. · Limit shopping to essentials, like food and medicines. · Wear a cloth face cover if you have to go to a public place like the grocery store or pharmacy. · Don't eat in restaurants. (You can still get takeout or food deliveries.)  · Avoid crowds and busy places. Follow stay-at-home orders or other directions for your area. Where can you learn more?   Go to http://www.gray.com/  Enter A133 in the search box to learn more about \"Learning About COVID-19 and Social Distancing. \"  Current as of: December 18, 2020               Content Version: 12.8  © 2006-2021 [employer]. Care instructions adapted under license by [employer] (which disclaims liability or warranty for this information). If you have questions about a medical condition or this instruction, always ask your healthcare professional. [address] any warranty or liability for your use of this information.     ___________________________________________________________________________________________________________________________________

## 2021-09-02 NOTE — PERIOP NOTES
Reviewed PTA medication list with patient/caregiver and patient/caregiver denies any additional medications. Patient admits to having a responsible adult care for them at home for at least 24 hours after surgery. Patient encouraged to use gown warming system and informed that using said warming gown to regulate body temperature prior to a procedure has been shown to help reduce the risks of blood clots and infection. Patient's pharmacy of choice verified and documented in PTA medication section. Dual skin assessment & fall risk band verification completed with Moira Bhatti RN.

## 2021-09-02 NOTE — PERIOP NOTES
TRANSFER - OUT REPORT:    Verbal report given to [facility], RN(name) on [facility]  being transferred to phase 2(unit) for routine post - op       Report consisted of patients Situation, Background, Assessment and   Recommendations(SBAR). Information from the following report(s) SBAR, Kardex, OR Summary, Procedure Summary, Intake/Output and MAR was reviewed with the receiving nurse. Lines:   Peripheral IV 09/02/21 Right; Outer Cephalic (Active)   Site Assessment Clean, dry, & intact 09/02/21 0900   Phlebitis Assessment 0 09/02/21 0900   Infiltration Assessment 0 09/02/21 0900   Dressing Status Clean, dry, & intact 09/02/21 0900   Dressing Type Transparent;Tape 09/02/21 0900   Hub Color/Line Status Pink; Infusing 09/02/21 0900   Alcohol Cap Used No 09/02/21 0626        Opportunity for questions and clarification was provided.       Patient transported with:   Registered Nurse  Tech

## (undated) DEVICE — (D)STRIP SKN CLSR 0.5X4IN WHT --

## (undated) DEVICE — SYR 10ML CTRL LR LCK NSAF LF --

## (undated) DEVICE — LEAD KT INTRO INTERSTIM --

## (undated) DEVICE — LEAD IMPLANT KIT: Brand: AXONICS

## (undated) DEVICE — 3-0 COATED VICRYL PLUS UNDYED 1X27" SH --

## (undated) DEVICE — 3M™ STERI-DRAPE™ INCISE DRAPE 1050 (60CM X 45CM): Brand: STERI-DRAPE™

## (undated) DEVICE — MEDI-VAC NON-CONDUCTIVE SUCTION TUBING: Brand: CARDINAL HEALTH

## (undated) DEVICE — SKIN MARKER,REGULAR TIP WITH RULER AND LABELS: Brand: DEVON

## (undated) DEVICE — DRAPE XR C ARM 41X74IN LF --

## (undated) DEVICE — REM POLYHESIVE ADULT PATIENT RETURN ELECTRODE: Brand: VALLEYLAB

## (undated) DEVICE — MAYO STAND COVER: Brand: CONVERTORS

## (undated) DEVICE — CYSTO PACK: Brand: MEDLINE INDUSTRIES, INC.

## (undated) DEVICE — C-ARMOR C-ARM EQUIPMENT COVERS CLEAR STERILE UNIVERSAL FIT 12 PER CASE: Brand: C-ARMOR

## (undated) DEVICE — SUTURE VCRL + SZ 4-0 L27IN ABSRB UD PS-2 3/8 CIR REV CUT VCP426H

## (undated) DEVICE — GARMENT,MEDLINE,DVT,INT,CALF,MED, GEN2: Brand: MEDLINE

## (undated) DEVICE — SUTURE COAT VCRL SZ 4-0 L18IN ABSRB UD L19MM PS-2 1/2 CIR J496G

## (undated) DEVICE — SOL IRRIGATION INJ NACL 0.9% 500ML BTL

## (undated) DEVICE — SHEET,DRAPE,40X58,STERILE: Brand: MEDLINE

## (undated) DEVICE — Device

## (undated) DEVICE — INTENDED FOR TISSUE SEPARATION, AND OTHER PROCEDURES THAT REQUIRE A SHARP SURGICAL BLADE TO PUNCTURE OR CUT.: Brand: BARD-PARKER ® CARBON RIB-BACK BLADES

## (undated) DEVICE — DEVON™ KNEE AND BODY STRAP 60" X 3" (1.5 M X 7.6 CM): Brand: DEVON

## (undated) DEVICE — SOLUTION IRRIGATION H2O 0797305] ICU MEDICAL INC]

## (undated) DEVICE — STERILE LATEX POWDER-FREE SURGICAL GLOVESWITH NITRILE COATING: Brand: PROTEXIS

## (undated) DEVICE — SPONGE GZ W4XL4IN COT 12 PLY TYP VII WVN C FLD DSGN

## (undated) DEVICE — MINOR: Brand: MEDLINE INDUSTRIES, INC.

## (undated) DEVICE — 1010 S-DRAPE TOWEL DRAPE 10/BX: Brand: STERI-DRAPE™

## (undated) DEVICE — CHARGING SYSTEM: Brand: AXONICS

## (undated) DEVICE — SOLUTION IRRIG 1500ML STRL H2O USP POUR PLAS BTL

## (undated) DEVICE — STRAP,POSITIONING,KNEE/BODY,FOAM,4X60": Brand: MEDLINE

## (undated) DEVICE — GOWN,SLEEVE,STERILE,W/CSR WRAP,1/P: Brand: MEDLINE

## (undated) DEVICE — REMOTE CONTROL: Brand: AXONICS

## (undated) DEVICE — TRAY PREP DRY W/ PREM GLV 2 APPL 6 SPNG 2 UNDPD 1 OVERWRAP

## (undated) DEVICE — 3M™ TEGADERM™ TRANSPARENT FILM DRESSING FRAME STYLE, 1626W, 4 IN X 4-3/4 IN (10 CM X 12 CM), 50/CT 4CT/CASE: Brand: 3M™ TEGADERM™

## (undated) DEVICE — SOLUTION IRRIG 3000ML 0.9% SOD CHL FLX CONT 0797208] ICU MEDICAL INC]

## (undated) DEVICE — SUT CHRMC 4-0 27IN RB1 BRN --

## (undated) DEVICE — GLOVE ORANGE PI 7   MSG9070

## (undated) DEVICE — STRIP,CLOSURE,WOUND,MEDI-STRIP,1/2X4: Brand: MEDLINE

## (undated) DEVICE — PROGRAMMER NEUROSTIMULATOR PT FOR URIN CTRL INTERSTIM ICON

## (undated) DEVICE — MASTISOL ADHESIVE LIQ 2/3ML

## (undated) DEVICE — BAG URIN LEG DISPOZ-A-BG 19OZ -- W/18IN EXT TUBING

## (undated) DEVICE — SOLUTION IRRIG 1500ML 0.9% SOD CHL USP POUR PLAS BTL

## (undated) DEVICE — GOWN,AURORA,NONRNF,XL,30/CS: Brand: MEDLINE

## (undated) DEVICE — ANTENNA PT PRGMR EXT DETACH RECHRG DBS LEGEND

## (undated) DEVICE — NEEDLE TEST STIM. CABLE: Brand: AXONICS

## (undated) DEVICE — SUT PROL 2-0 30IN SH BLU --

## (undated) DEVICE — BSHR MAJOR BASIN PACK-LF: Brand: MEDLINE INDUSTRIES, INC.

## (undated) DEVICE — DBD-PACK,LAPAROTOMY,2 REINFORCED GOWNS: Brand: MEDLINE

## (undated) DEVICE — KENDALL SCD EXPRESS SLEEVES, KNEE LENGTH, MEDIUM: Brand: KENDALL SCD

## (undated) DEVICE — 3M™ TEGADERM™ TRANSPARENT FILM DRESSING FRAME STYLE, 1627, 4 IN X 10 IN (10 CM X 25 CM), 20/CT 4CT/CASE: Brand: 3M™ TEGADERM™